# Patient Record
Sex: FEMALE | Race: ASIAN | NOT HISPANIC OR LATINO
[De-identification: names, ages, dates, MRNs, and addresses within clinical notes are randomized per-mention and may not be internally consistent; named-entity substitution may affect disease eponyms.]

---

## 2019-12-03 PROBLEM — Z00.00 ENCOUNTER FOR PREVENTIVE HEALTH EXAMINATION: Status: ACTIVE | Noted: 2019-12-03

## 2019-12-04 ENCOUNTER — APPOINTMENT (OUTPATIENT)
Dept: CARDIOLOGY | Facility: CLINIC | Age: 79
End: 2019-12-04
Payer: MEDICARE

## 2019-12-04 VITALS
OXYGEN SATURATION: 97 % | SYSTOLIC BLOOD PRESSURE: 120 MMHG | RESPIRATION RATE: 17 BRPM | WEIGHT: 120 LBS | HEART RATE: 91 BPM | HEIGHT: 60.5 IN | TEMPERATURE: 98 F | BODY MASS INDEX: 22.95 KG/M2 | DIASTOLIC BLOOD PRESSURE: 73 MMHG

## 2019-12-04 DIAGNOSIS — E78.5 HYPERLIPIDEMIA, UNSPECIFIED: ICD-10-CM

## 2019-12-04 DIAGNOSIS — M79.622 PAIN IN LEFT UPPER ARM: ICD-10-CM

## 2019-12-04 DIAGNOSIS — H54.8 LEGAL BLINDNESS, AS DEFINED IN USA: ICD-10-CM

## 2019-12-04 DIAGNOSIS — M54.9 DORSALGIA, UNSPECIFIED: ICD-10-CM

## 2019-12-04 DIAGNOSIS — I20.8 OTHER FORMS OF ANGINA PECTORIS: ICD-10-CM

## 2019-12-04 DIAGNOSIS — R00.2 PALPITATIONS: ICD-10-CM

## 2019-12-04 DIAGNOSIS — M81.0 AGE-RELATED OSTEOPOROSIS W/OUT CURRENT PATHOLOGICAL FRACTURE: ICD-10-CM

## 2019-12-04 PROCEDURE — 93015 CV STRESS TEST SUPVJ I&R: CPT

## 2019-12-04 PROCEDURE — 93306 TTE W/DOPPLER COMPLETE: CPT

## 2019-12-04 PROCEDURE — 99204 OFFICE O/P NEW MOD 45 MIN: CPT | Mod: 25

## 2019-12-04 PROCEDURE — ZZZZZ: CPT

## 2019-12-04 RX ORDER — RALOXIFENE HYDROCHLORIDE 60 MG/1
60 TABLET, FILM COATED ORAL
Refills: 0 | Status: ACTIVE | COMMUNITY

## 2019-12-04 RX ORDER — ASPIRIN ENTERIC COATED TABLETS 81 MG 81 MG/1
81 TABLET, DELAYED RELEASE ORAL
Refills: 0 | Status: ACTIVE | COMMUNITY

## 2019-12-04 RX ORDER — SIMVASTATIN 5 MG/1
5 TABLET, FILM COATED ORAL
Refills: 0 | Status: ACTIVE | COMMUNITY

## 2019-12-04 NOTE — HISTORY OF PRESENT ILLNESS
[FreeTextEntry1] : 79 year-old female with HLD presents for evaluation of L arm/back pain for the last 3 weeks. Patient reports that her arm and back feels sore, not related to exertion. Patient reports that she also gets SOB and diaphoretic lasting 2 hours. Patient's L shoulder is tender to touch. Patient reports SOB with exertion. I advised patient to undergo an echocardiogram and a treadmill stress test. I advised patient to wear a Holter monitor.

## 2019-12-10 ENCOUNTER — RESULT REVIEW (OUTPATIENT)
Age: 79
End: 2019-12-10

## 2019-12-13 ENCOUNTER — NON-APPOINTMENT (OUTPATIENT)
Age: 79
End: 2019-12-13

## 2019-12-31 ENCOUNTER — RESULT REVIEW (OUTPATIENT)
Age: 79
End: 2019-12-31

## 2023-10-20 ENCOUNTER — INPATIENT (INPATIENT)
Facility: HOSPITAL | Age: 83
LOS: 2 days | Discharge: HOME HEALTH | DRG: 179 | End: 2023-10-23
Attending: HOSPITALIST | Admitting: HOSPITALIST
Payer: MEDICARE

## 2023-10-20 VITALS
RESPIRATION RATE: 18 BRPM | OXYGEN SATURATION: 99 % | TEMPERATURE: 99 F | DIASTOLIC BLOOD PRESSURE: 79 MMHG | SYSTOLIC BLOOD PRESSURE: 127 MMHG | HEART RATE: 73 BPM

## 2023-10-20 DIAGNOSIS — Z90.49 ACQUIRED ABSENCE OF OTHER SPECIFIED PARTS OF DIGESTIVE TRACT: Chronic | ICD-10-CM

## 2023-10-20 DIAGNOSIS — R42 DIZZINESS AND GIDDINESS: ICD-10-CM

## 2023-10-20 LAB
ALBUMIN SERPL ELPH-MCNC: 4.4 G/DL — SIGNIFICANT CHANGE UP (ref 3.3–5)
ALBUMIN SERPL ELPH-MCNC: 4.4 G/DL — SIGNIFICANT CHANGE UP (ref 3.3–5)
ALP SERPL-CCNC: 80 U/L — SIGNIFICANT CHANGE UP (ref 40–120)
ALP SERPL-CCNC: 80 U/L — SIGNIFICANT CHANGE UP (ref 40–120)
ALT FLD-CCNC: 15 U/L — SIGNIFICANT CHANGE UP (ref 10–45)
ALT FLD-CCNC: 15 U/L — SIGNIFICANT CHANGE UP (ref 10–45)
ANION GAP SERPL CALC-SCNC: 11 MMOL/L — SIGNIFICANT CHANGE UP (ref 5–17)
ANION GAP SERPL CALC-SCNC: 11 MMOL/L — SIGNIFICANT CHANGE UP (ref 5–17)
APPEARANCE UR: CLEAR — SIGNIFICANT CHANGE UP
APPEARANCE UR: CLEAR — SIGNIFICANT CHANGE UP
APTT BLD: 28.4 SEC — SIGNIFICANT CHANGE UP (ref 24.5–35.6)
APTT BLD: 28.4 SEC — SIGNIFICANT CHANGE UP (ref 24.5–35.6)
AST SERPL-CCNC: 20 U/L — SIGNIFICANT CHANGE UP (ref 10–40)
AST SERPL-CCNC: 20 U/L — SIGNIFICANT CHANGE UP (ref 10–40)
BACTERIA # UR AUTO: NEGATIVE — SIGNIFICANT CHANGE UP
BACTERIA # UR AUTO: NEGATIVE — SIGNIFICANT CHANGE UP
BASOPHILS # BLD AUTO: 0.01 K/UL — SIGNIFICANT CHANGE UP (ref 0–0.2)
BASOPHILS # BLD AUTO: 0.01 K/UL — SIGNIFICANT CHANGE UP (ref 0–0.2)
BASOPHILS NFR BLD AUTO: 0.2 % — SIGNIFICANT CHANGE UP (ref 0–2)
BASOPHILS NFR BLD AUTO: 0.2 % — SIGNIFICANT CHANGE UP (ref 0–2)
BILIRUB SERPL-MCNC: 0.5 MG/DL — SIGNIFICANT CHANGE UP (ref 0.2–1.2)
BILIRUB SERPL-MCNC: 0.5 MG/DL — SIGNIFICANT CHANGE UP (ref 0.2–1.2)
BILIRUB UR-MCNC: NEGATIVE — SIGNIFICANT CHANGE UP
BILIRUB UR-MCNC: NEGATIVE — SIGNIFICANT CHANGE UP
BUN SERPL-MCNC: 13 MG/DL — SIGNIFICANT CHANGE UP (ref 7–23)
BUN SERPL-MCNC: 13 MG/DL — SIGNIFICANT CHANGE UP (ref 7–23)
CALCIUM SERPL-MCNC: 9.6 MG/DL — SIGNIFICANT CHANGE UP (ref 8.4–10.5)
CALCIUM SERPL-MCNC: 9.6 MG/DL — SIGNIFICANT CHANGE UP (ref 8.4–10.5)
CHLORIDE SERPL-SCNC: 101 MMOL/L — SIGNIFICANT CHANGE UP (ref 96–108)
CHLORIDE SERPL-SCNC: 101 MMOL/L — SIGNIFICANT CHANGE UP (ref 96–108)
CO2 SERPL-SCNC: 29 MMOL/L — SIGNIFICANT CHANGE UP (ref 22–31)
CO2 SERPL-SCNC: 29 MMOL/L — SIGNIFICANT CHANGE UP (ref 22–31)
COLOR SPEC: COLORLESS — SIGNIFICANT CHANGE UP
COLOR SPEC: COLORLESS — SIGNIFICANT CHANGE UP
CREAT SERPL-MCNC: 0.55 MG/DL — SIGNIFICANT CHANGE UP (ref 0.5–1.3)
CREAT SERPL-MCNC: 0.55 MG/DL — SIGNIFICANT CHANGE UP (ref 0.5–1.3)
DIFF PNL FLD: NEGATIVE — SIGNIFICANT CHANGE UP
DIFF PNL FLD: NEGATIVE — SIGNIFICANT CHANGE UP
EGFR: 91 ML/MIN/1.73M2 — SIGNIFICANT CHANGE UP
EGFR: 91 ML/MIN/1.73M2 — SIGNIFICANT CHANGE UP
EOSINOPHIL # BLD AUTO: 0.06 K/UL — SIGNIFICANT CHANGE UP (ref 0–0.5)
EOSINOPHIL # BLD AUTO: 0.06 K/UL — SIGNIFICANT CHANGE UP (ref 0–0.5)
EOSINOPHIL NFR BLD AUTO: 1 % — SIGNIFICANT CHANGE UP (ref 0–6)
EOSINOPHIL NFR BLD AUTO: 1 % — SIGNIFICANT CHANGE UP (ref 0–6)
EPI CELLS # UR: 0 /HPF — SIGNIFICANT CHANGE UP
EPI CELLS # UR: 0 /HPF — SIGNIFICANT CHANGE UP
GLUCOSE SERPL-MCNC: 135 MG/DL — HIGH (ref 70–99)
GLUCOSE SERPL-MCNC: 135 MG/DL — HIGH (ref 70–99)
GLUCOSE UR QL: NEGATIVE — SIGNIFICANT CHANGE UP
GLUCOSE UR QL: NEGATIVE — SIGNIFICANT CHANGE UP
HCT VFR BLD CALC: 37.5 % — SIGNIFICANT CHANGE UP (ref 34.5–45)
HCT VFR BLD CALC: 37.5 % — SIGNIFICANT CHANGE UP (ref 34.5–45)
HGB BLD-MCNC: 12.6 G/DL — SIGNIFICANT CHANGE UP (ref 11.5–15.5)
HGB BLD-MCNC: 12.6 G/DL — SIGNIFICANT CHANGE UP (ref 11.5–15.5)
HYALINE CASTS # UR AUTO: 0 /LPF — SIGNIFICANT CHANGE UP (ref 0–2)
HYALINE CASTS # UR AUTO: 0 /LPF — SIGNIFICANT CHANGE UP (ref 0–2)
IMM GRANULOCYTES NFR BLD AUTO: 0.3 % — SIGNIFICANT CHANGE UP (ref 0–0.9)
IMM GRANULOCYTES NFR BLD AUTO: 0.3 % — SIGNIFICANT CHANGE UP (ref 0–0.9)
INR BLD: 1.05 RATIO — SIGNIFICANT CHANGE UP (ref 0.85–1.18)
INR BLD: 1.05 RATIO — SIGNIFICANT CHANGE UP (ref 0.85–1.18)
KETONES UR-MCNC: NEGATIVE — SIGNIFICANT CHANGE UP
KETONES UR-MCNC: NEGATIVE — SIGNIFICANT CHANGE UP
LEUKOCYTE ESTERASE UR-ACNC: NEGATIVE — SIGNIFICANT CHANGE UP
LEUKOCYTE ESTERASE UR-ACNC: NEGATIVE — SIGNIFICANT CHANGE UP
LYMPHOCYTES # BLD AUTO: 2.05 K/UL — SIGNIFICANT CHANGE UP (ref 1–3.3)
LYMPHOCYTES # BLD AUTO: 2.05 K/UL — SIGNIFICANT CHANGE UP (ref 1–3.3)
LYMPHOCYTES # BLD AUTO: 34.7 % — SIGNIFICANT CHANGE UP (ref 13–44)
LYMPHOCYTES # BLD AUTO: 34.7 % — SIGNIFICANT CHANGE UP (ref 13–44)
MCHC RBC-ENTMCNC: 30.1 PG — SIGNIFICANT CHANGE UP (ref 27–34)
MCHC RBC-ENTMCNC: 30.1 PG — SIGNIFICANT CHANGE UP (ref 27–34)
MCHC RBC-ENTMCNC: 33.6 GM/DL — SIGNIFICANT CHANGE UP (ref 32–36)
MCHC RBC-ENTMCNC: 33.6 GM/DL — SIGNIFICANT CHANGE UP (ref 32–36)
MCV RBC AUTO: 89.7 FL — SIGNIFICANT CHANGE UP (ref 80–100)
MCV RBC AUTO: 89.7 FL — SIGNIFICANT CHANGE UP (ref 80–100)
MONOCYTES # BLD AUTO: 0.63 K/UL — SIGNIFICANT CHANGE UP (ref 0–0.9)
MONOCYTES # BLD AUTO: 0.63 K/UL — SIGNIFICANT CHANGE UP (ref 0–0.9)
MONOCYTES NFR BLD AUTO: 10.7 % — SIGNIFICANT CHANGE UP (ref 2–14)
MONOCYTES NFR BLD AUTO: 10.7 % — SIGNIFICANT CHANGE UP (ref 2–14)
NEUTROPHILS # BLD AUTO: 3.14 K/UL — SIGNIFICANT CHANGE UP (ref 1.8–7.4)
NEUTROPHILS # BLD AUTO: 3.14 K/UL — SIGNIFICANT CHANGE UP (ref 1.8–7.4)
NEUTROPHILS NFR BLD AUTO: 53.1 % — SIGNIFICANT CHANGE UP (ref 43–77)
NEUTROPHILS NFR BLD AUTO: 53.1 % — SIGNIFICANT CHANGE UP (ref 43–77)
NITRITE UR-MCNC: NEGATIVE — SIGNIFICANT CHANGE UP
NITRITE UR-MCNC: NEGATIVE — SIGNIFICANT CHANGE UP
NRBC # BLD: 0 /100 WBCS — SIGNIFICANT CHANGE UP (ref 0–0)
NRBC # BLD: 0 /100 WBCS — SIGNIFICANT CHANGE UP (ref 0–0)
PH UR: 6.5 — SIGNIFICANT CHANGE UP (ref 5–8)
PH UR: 6.5 — SIGNIFICANT CHANGE UP (ref 5–8)
PLATELET # BLD AUTO: 225 K/UL — SIGNIFICANT CHANGE UP (ref 150–400)
PLATELET # BLD AUTO: 225 K/UL — SIGNIFICANT CHANGE UP (ref 150–400)
POTASSIUM SERPL-MCNC: 4.2 MMOL/L — SIGNIFICANT CHANGE UP (ref 3.5–5.3)
POTASSIUM SERPL-MCNC: 4.2 MMOL/L — SIGNIFICANT CHANGE UP (ref 3.5–5.3)
POTASSIUM SERPL-SCNC: 4.2 MMOL/L — SIGNIFICANT CHANGE UP (ref 3.5–5.3)
POTASSIUM SERPL-SCNC: 4.2 MMOL/L — SIGNIFICANT CHANGE UP (ref 3.5–5.3)
PROT SERPL-MCNC: 7.3 G/DL — SIGNIFICANT CHANGE UP (ref 6–8.3)
PROT SERPL-MCNC: 7.3 G/DL — SIGNIFICANT CHANGE UP (ref 6–8.3)
PROT UR-MCNC: NEGATIVE — SIGNIFICANT CHANGE UP
PROT UR-MCNC: NEGATIVE — SIGNIFICANT CHANGE UP
PROTHROM AB SERPL-ACNC: 11 SEC — SIGNIFICANT CHANGE UP (ref 9.5–13)
PROTHROM AB SERPL-ACNC: 11 SEC — SIGNIFICANT CHANGE UP (ref 9.5–13)
RAPID RVP RESULT: DETECTED
RAPID RVP RESULT: DETECTED
RBC # BLD: 4.18 M/UL — SIGNIFICANT CHANGE UP (ref 3.8–5.2)
RBC # BLD: 4.18 M/UL — SIGNIFICANT CHANGE UP (ref 3.8–5.2)
RBC # FLD: 13.2 % — SIGNIFICANT CHANGE UP (ref 10.3–14.5)
RBC # FLD: 13.2 % — SIGNIFICANT CHANGE UP (ref 10.3–14.5)
RBC CASTS # UR COMP ASSIST: 0 /HPF — SIGNIFICANT CHANGE UP (ref 0–4)
RBC CASTS # UR COMP ASSIST: 0 /HPF — SIGNIFICANT CHANGE UP (ref 0–4)
SARS-COV-2 RNA SPEC QL NAA+PROBE: DETECTED
SARS-COV-2 RNA SPEC QL NAA+PROBE: DETECTED
SODIUM SERPL-SCNC: 141 MMOL/L — SIGNIFICANT CHANGE UP (ref 135–145)
SODIUM SERPL-SCNC: 141 MMOL/L — SIGNIFICANT CHANGE UP (ref 135–145)
SP GR SPEC: 1.05 — HIGH (ref 1.01–1.02)
SP GR SPEC: 1.05 — HIGH (ref 1.01–1.02)
TROPONIN T, HIGH SENSITIVITY RESULT: 16 NG/L — SIGNIFICANT CHANGE UP (ref 0–51)
TROPONIN T, HIGH SENSITIVITY RESULT: 16 NG/L — SIGNIFICANT CHANGE UP (ref 0–51)
UROBILINOGEN FLD QL: NEGATIVE — SIGNIFICANT CHANGE UP
UROBILINOGEN FLD QL: NEGATIVE — SIGNIFICANT CHANGE UP
WBC # BLD: 5.91 K/UL — SIGNIFICANT CHANGE UP (ref 3.8–10.5)
WBC # BLD: 5.91 K/UL — SIGNIFICANT CHANGE UP (ref 3.8–10.5)
WBC # FLD AUTO: 5.91 K/UL — SIGNIFICANT CHANGE UP (ref 3.8–10.5)
WBC # FLD AUTO: 5.91 K/UL — SIGNIFICANT CHANGE UP (ref 3.8–10.5)
WBC UR QL: 2 /HPF — SIGNIFICANT CHANGE UP (ref 0–5)
WBC UR QL: 2 /HPF — SIGNIFICANT CHANGE UP (ref 0–5)

## 2023-10-20 PROCEDURE — 71045 X-RAY EXAM CHEST 1 VIEW: CPT | Mod: 26

## 2023-10-20 PROCEDURE — 99285 EMERGENCY DEPT VISIT HI MDM: CPT

## 2023-10-20 RX ORDER — MECLIZINE HCL 12.5 MG
25 TABLET ORAL ONCE
Refills: 0 | Status: COMPLETED | OUTPATIENT
Start: 2023-10-20 | End: 2023-10-20

## 2023-10-20 RX ORDER — ONDANSETRON 8 MG/1
4 TABLET, FILM COATED ORAL ONCE
Refills: 0 | Status: COMPLETED | OUTPATIENT
Start: 2023-10-20 | End: 2023-10-20

## 2023-10-20 RX ADMIN — ONDANSETRON 4 MILLIGRAM(S): 8 TABLET, FILM COATED ORAL at 19:05

## 2023-10-20 RX ADMIN — Medication 25 MILLIGRAM(S): at 19:06

## 2023-10-20 NOTE — CONSULT NOTE ADULT - NSCONSULTADDITIONALINFOA_GEN_ALL_CORE
Neurovascular Fellow Attestation    83F with positional dizziness now resolved without other focal neurological symptoms. PMH of HLD and legally blind was also found to have COVID+ on admission. CTH/CTA is unremarkable for acute changes or hemodynamically significant steno-occlusive disease. Given the patient’s clinical history, nonfocal neurological examination, and imaging findings, patient’s symptoms could be secondary to COVID vs. Peripheral vertigo. As she is back to baseline currently, would not recommend further inpatient neurovascular workup. If her symptoms recur, can consider general neurology investigation for peripheral vestibular dysfunction.    Kulwant Espinal  Neurovascular Fellow

## 2023-10-20 NOTE — ED PROVIDER NOTE - ATTENDING CONTRIBUTION TO CARE
Attending Yfn: I performed a history and physical exam of the patient and discussed their management with the resident/fellow/student. I have reviewed the resident/fellow/student note and agree with the documented findings and plan of care, except as noted. I have personally performed a substantive portion of the visit including all aspects of the medical decision making. My medical decision making and observations are found above. Please refer to any progress notes for updates on clinical course. My notes supersedes the above resident/fellow/student note in case of discrepancy

## 2023-10-20 NOTE — CONSULT NOTE ADULT - ATTENDING COMMENTS
I reviewed available diagnostic studies, and reviewed images personally. I agree with resident & fellow 's history, exam, orders placed, and plan of care. dizziness most likely peripheral vs 2/2 COVID+. Back to baseline. No further neuro w up necessary. CT/CTA/CTP wo significant occlusion or stenosis to explain her symptoms. asa 81mg to continue. Service provided on 10/21/2023.

## 2023-10-20 NOTE — ED ADULT NURSE NOTE - NSFALLRISKINTERV_ED_ALL_ED

## 2023-10-20 NOTE — CONSULT NOTE ADULT - SUBJECTIVE AND OBJECTIVE BOX
Neurology - Consult Note    -  Spectra: [phone] ([facility]), [phone] ([facility])  -    HPI: Patient ALEKSANDR RAMIREZ is a 83y (1940) woman with a PMHx significant for HLD, legally blind b/l (unclear etiology) who presented to the ED for dizziness. Patient suddenly felt room spinning dizziness worse with movement; vomited x2. Still symptomatic. Denied HA, weakness, numbness, vision changes. She had difficulty ambulating.       Review of Systems:     All other review of systems is negative unless indicated above.    Allergies:  No Known Allergies      PMHx/PSHx/Family Hx: As above, otherwise see below       Social Hx:  No reported use of tobacco, alcohol, or illicit drugs    Medications:  MEDICATIONS  (STANDING):  meclizine 25 milliGRAM(s) Oral Once  ondansetron Injectable 4 milliGRAM(s) IV Push once    MEDICATIONS  (PRN):        Home Medications:      Vitals:  T(C): 37.1 (10-20-23 @ 18:25), Max: 37.1 (10-20-23 @ 18:25)  HR: 73 (10-20-23 @ 18:25) (73 - 73)  BP: 127/79 (10-20-23 @ 18:25) (127/79 - 127/79)  RR: 18 (10-20-23 @ 18:25) (18 - 18)  SpO2: 99% (10-20-23 @ 18:25) (99% - 99%)    Physical Examination: INCOMPLETE  General - NAD  Cardiovascular - Peripheral pulses palpable, no edema    Neurologic Exam:  Mental status - Awake, Alert, Oriented to person, place, and time. Speech fluent, repetition and naming intact. Follows simple and complex commands. attention, concentration and fund of knowledge intact.     Cranial nerves:  CN II: Visual fields are full to confrontation. Pupils are 3 mm and briskly reactive to light.   CN III, IV, VI: EOMI, no nystagmus, no ptosis  CN V: Facial sensation is intact to pinprick in all 3 divisions bilaterally.  CN VII: Face with mild left NLFF with normal eye closure and smile.  CN VII: Hearing is normal to rubbing fingers  CN IX, X: Palate elevates symmetrically. Phonation is normal.  CN XI: Head turning and shoulder shrug are intact  CN XII: Tongue is midline with normal movements and no atrophy.    Motor - Normal bulk and tone throughout. No pronator drift.  Strength testing            Deltoid      Biceps      Triceps     Wrist Extension    Wrist Flexion       R            5                 5               5                     5                              5                        5  L             5                 5               5                     5                              5                       5              Hip Flexion    Hip Extension    Knee Flexion    Knee Extension    Dorsiflexion    Plantar Flexion  R              5                           5                       5                           5                            5                          5  L              5                           5                        5                           5                            5                          5    neg hoffmans b/l    Sensation - Light touch/temperature intact throughout    DTR's -             Biceps      Triceps     Brachioradialis      Patellar    Ankle    Toes/plantar response  R             2+             2+                  2+                       2+            2+                 Down  L              2+             2+                 2+                        2+           2+                 Down    Coordination - Finger to Nose and heal to shin intact b/l. No tremors appreciated    Gait and station - Normal casual gait. Romberg (-)    Labs:          CAPILLARY BLOOD GLUCOSE      POCT Blood Glucose.: 124 mg/dL (20 Oct 2023 18:30)          CSF:                  Radiology:     Neurology - Consult Note    -  Spectra: [phone] ([facility]), [phone] ([facility])  -    HPI: Patient ALEKSANDR RAMIREZ is a 83y (1940) woman mandarin speaking with a PMHx significant for HLD, legally blind b/l (unclear etiology) who presented to the ED for dizziness. LKW 9am; Patient suddenly felt room spinning dizziness worse with movement; vomited x2. Denied HA, weakness, numbness, vision changes. No prior similar episodes. Pt was on asa however taken off 3-4 months ago by family doctor.  PT c/o around one week of wet cough, denied fevers/chills.    NIHSS: 0  Baseline mRS: 0  Not a tenecteplase candidate due to presentation outside the window.   Not a candidate for thrombectomy due to no LVO on imaging.       Review of Systems:     All other review of systems is negative unless indicated above.    Allergies:  No Known Allergies      PMHx/PSHx/Family Hx: As above, otherwise see below       Social Hx:  No reported use of tobacco, alcohol, or illicit drugs    Medications:  MEDICATIONS  (STANDING):  meclizine 25 milliGRAM(s) Oral Once  ondansetron Injectable 4 milliGRAM(s) IV Push once    MEDICATIONS  (PRN):        Home Medications:      Vitals:  T(C): 37.1 (10-20-23 @ 18:25), Max: 37.1 (10-20-23 @ 18:25)  HR: 73 (10-20-23 @ 18:25) (73 - 73)  BP: 127/79 (10-20-23 @ 18:25) (127/79 - 127/79)  RR: 18 (10-20-23 @ 18:25) (18 - 18)  SpO2: 99% (10-20-23 @ 18:25) (99% - 99%)    Physical Examination:    General - NAD  Cardiovascular - Peripheral pulses palpable, no edema    Neurologic Exam:  Mental status - Awake, Alert, Oriented to person, place, and time. Speech fluent, repetition and naming intact. Follows simple and complex commands. attention, concentration and fund of knowledge intact.       CNs: PERRL (R 2mm, L 2mm). blink to threat b/l. cannot count fingers b/l (chronic) EOMI. V1-3 intact LT, No facial asymmetry b/l. Hearing grossly normal b/l. Tongue midline.     Motor - Normal bulk and tone throughout. No pronator drift.  Strength testing            Deltoid      Biceps      Triceps     Wrist Extension    Wrist Flexion       R            5                 5               5                     5                              5                        5  L             5                 5               5                     5                              5                       5              Hip Flexion    Hip Extension    Knee Flexion    Knee Extension    Dorsiflexion    Plantar Flexion  R              5                           5                       5                           5                            5                          5  L              5                           5                        5                           5                            5                          5    neg hoffmans b/l    Sensation - Light touch/temperature intact throughout    DTR's -             Biceps      Triceps     Brachioradialis      Patellar    Ankle    Toes/plantar response  R             2+             2+                  2+                       2+            2+                 Down  L              2+             2+                 2+                        2+           2+                 Down    Coordination - Finger to Nose and heal to shin intact b/l. No tremors appreciated    Gait and station - unable to walk d/t dizziness     Labs:          CAPILLARY BLOOD GLUCOSE      POCT Blood Glucose.: 124 mg/dL (20 Oct 2023 18:30)           Radiology:

## 2023-10-20 NOTE — ED ADULT NURSE NOTE - OBJECTIVE STATEMENT
pt family prefers to translate for patient. pt primary language is mandarin. as per pt family pt "started to feel dizzy and vomited twice at 1400. the dizziness hasn't gone away and is still very dizzy." pt follows commands, denies any lightheadedness, chest pain, SOB, abd. pain, diarrhea, numbness/tingling at present. left sided flattened nasolabial fold noted at present. pt speaking in clear sentences. pt moves all extremities and has full ROM, pt ambulatory with unsteady gait at present.

## 2023-10-20 NOTE — ED PROVIDER NOTE - PROGRESS NOTE DETAILS
Attending Yfn: signed out to evening attending pending final neuro recs. likely admission. Pt reassessed. States she is feeling less dizzy. Denies nausea. Assessed gait. Was able to walk however with unsteady gait and small steps. Informed of plan for admission. Agreeable to plan. SL Pt reassessed. States she is feeling less dizzy. Denies nausea. Assessed gait. Was able to walk w/ assistance, unsteady gait and small steps. Informed pt and daughter at bedside of plan for admission. Agreeable to plan. SL Pt reassessed. States she is feeling less dizzy after meclizine. Denies nausea. Assessed gait. Was able to walk w/ assistance, unsteady gait and small steps. Informed pt and daughter at bedside of plan for admission for further workup. Agreeable to plan. SL

## 2023-10-20 NOTE — CONSULT NOTE ADULT - ASSESSMENT
83y (1940) woman mandarin speaking with a PMHx significant for HLD, legally blind b/l (unclear etiology) who presented to the ED for dizziness. LKW 9am; Patient suddenly felt room spinning dizziness worse with movement; vomited x2. Denied HA, weakness, numbness, vision changes. No prior similar episodes. Pt was on asa however taken off 3-4 months ago by family doctor.  Pt c/o around one week of wet cough, denied fevers/chills.    Impression: Acute vestibular syndrome of unclear etiology likely peripheral etiology; intact vertebrobasilar system on cta to my eye. 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    Recommendations:    Imaging/Labs     [] F/u CT/CTA  [] HbA1C and Lipid Panel.  [] Orthostatics  [] MRI brain w/o contrast if admitted    Meds  []  IVFs  [] Meclizine 25mg BID PRN (can increase to 50mg Q8)  [] Ondansetron 8mg up to q8hr PRN OR Reglan 4mg PRN q8h PRN for N/V  [] Can try Clonzepam 0.5mg now then Q8H PRN (for 2 to 3 days)  [] Restart Aspirin 81MG PO daily (ASA 300MG MD daily if patient fails dysphagia screen)    Other  [] Telemonitoring; Neurochecks and vital signs per unit protocol, Q4H  [] Permissive HTN up to 220/120 mmHg for first 24 hours after symptom onset followed by gradual normotension.   [] BG goal <180, avoid hypoglycemia  [] Fall, aspiration precautions      continue to work up toxic/metabolic/ infection as you are doing     Patient can follow up with general neurology at [address] [place] [address] 1-2 weeks after discharge. Please instruct the patient to call [phone] to schedule this appointment.     Above mentioned plan was discussed with patient and available family member in detail. All the questions were answered and concerns were addressed.    Case discussed with stroke fellow under the supervision of stroke attending 83y (1940) woman mandarin speaking with a PMHx significant for HLD, legally blind b/l (unclear etiology) who presented to the ED for dizziness. LKW 9am; Patient suddenly felt room spinning dizziness worse with movement; vomited x2. Denied HA, weakness, numbness, vision changes. No prior similar episodes. Pt was on asa however taken off 3-4 months ago by family doctor.  Pt c/o around one week of wet cough, denied fevers/chills.    Impression: Acute vestibular syndrome of unclear etiology likely peripheral etiology; intact vertebrobasilar system on cta to my eye. CTP negative. 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    Recommendations:    Imaging/Labs     [] F/u CT/CTA  [] HbA1C and Lipid Panel.  [] Orthostatics  [] MRI brain w/o contrast if admitted    Meds  [] IVFs  [] Meclizine 25mg BID PRN (can increase to 50mg Q8)  [] Ondansetron 8mg up to q8hr PRN OR Reglan 4mg PRN q8h PRN for N/V  [] Can try Clonzepam 0.5mg now then Q8H PRN (for 2 to 3 days)  [] Restart Aspirin 81MG PO daily (ASA 300MG NM daily if patient fails dysphagia screen)    Other  [] Telemonitoring; Neurochecks and vital signs per unit protocol, Q4H  [] Permissive HTN up to 220/120 mmHg for first 24 hours after symptom onset followed by gradual normotension.   [] BG goal <180, avoid hypoglycemia  [] Fall, aspiration precautions      Continue to work up toxic/metabolic/ infection as you are doing     Patient can follow up with general neurology at [address] [place] [address] 1-2 weeks after discharge. Please instruct the patient to call [phone] to schedule this appointment.     Above mentioned plan was discussed with patient and available family member in detail. All the questions were answered and concerns were addressed.    Case discussed with stroke fellow under the supervision of stroke attending

## 2023-10-20 NOTE — ED PROVIDER NOTE - PHYSICAL EXAMINATION
Gen: NAD, AOx3, able to make needs known, non-toxic  Head: NCAT  HEENT: EOMI, oral mucosa moist, normal conjunctiva  Lung: no respiratory distress, CTAB, no wheezes/rhonchi/rales B/L, speaking in full sentences  CV: RRR, no murmurs  Abd: non distended, soft, nontender, no guarding, no CVA tenderness  MSK: no visible deformities  Neuro: Appears non focal. see NIHSS below  Skin: Warm, well perfused  Psych: normal affect

## 2023-10-20 NOTE — ED PROVIDER NOTE - NS ED MD DISPO ISOLATION TYPES
Contacted RN on floor she is unavailable at this time. She will call writer back when she becomes available to bring patient to the floor   None

## 2023-10-20 NOTE — ED PROVIDER NOTE - OBJECTIVE STATEMENT
83yof mandarin speaking w/ hx of HLD p/w dizziness since around 9am, acutely worsened around 2pm, a/w NBNB vomiting x2. States feeling things spinning and unable to stand still. Not improved by lying down. Brought in by daughter. Stroke code activated. Daughter reports pt has been having cough for the last week w/ productive cough for the last few days. Denies fever, SOB, CP, diarrhea, headache.

## 2023-10-20 NOTE — ED PROVIDER NOTE - CLINICAL SUMMARY MEDICAL DECISION MAKING FREE TEXT BOX
Attending Yfn: 82 y/o F w/ PMH of HLD presenting w/ dizziness. Seen w/ daughter. Code stroke at triage. Reports last felt well around 9am. At that time had developed some dizziness and vomiting. Symptoms worsened at 2pm. Had additional NBNB vomiting. Describing dizziness as room spinning. Having difficulty walking due to the dizziness. Usually walks w/o assistance. No AC use. No falls or head trauma. No meds taken at home. Pt overall no acute distress. See physical exam above as authored by me and NIHSS below as authored by me. Code stroke imaging and labs obtained. NIHSS of 1, outside of tenectaplase window. Will eval for metabolic vs. infectious abnormalities. Possible peripheral vertigo, will treat symptomatically. Plan for labs, meds, imaging, EKG. Will reassess the need for additional interventions as clinically warranted. Refer to any progress notes for updates on clinical course and as a continuation of this MDM.

## 2023-10-21 DIAGNOSIS — Z29.9 ENCOUNTER FOR PROPHYLACTIC MEASURES, UNSPECIFIED: ICD-10-CM

## 2023-10-21 DIAGNOSIS — H54.7 UNSPECIFIED VISUAL LOSS: ICD-10-CM

## 2023-10-21 DIAGNOSIS — U07.1 COVID-19: ICD-10-CM

## 2023-10-21 DIAGNOSIS — R42 DIZZINESS AND GIDDINESS: ICD-10-CM

## 2023-10-21 DIAGNOSIS — E78.5 HYPERLIPIDEMIA, UNSPECIFIED: ICD-10-CM

## 2023-10-21 LAB
A1C WITH ESTIMATED AVERAGE GLUCOSE RESULT: 5.8 % — HIGH (ref 4–5.6)
A1C WITH ESTIMATED AVERAGE GLUCOSE RESULT: 5.8 % — HIGH (ref 4–5.6)
ALBUMIN SERPL ELPH-MCNC: 3.7 G/DL — SIGNIFICANT CHANGE UP (ref 3.3–5)
ALBUMIN SERPL ELPH-MCNC: 3.7 G/DL — SIGNIFICANT CHANGE UP (ref 3.3–5)
ALP SERPL-CCNC: 66 U/L — SIGNIFICANT CHANGE UP (ref 40–120)
ALP SERPL-CCNC: 66 U/L — SIGNIFICANT CHANGE UP (ref 40–120)
ALT FLD-CCNC: 12 U/L — SIGNIFICANT CHANGE UP (ref 10–45)
ALT FLD-CCNC: 12 U/L — SIGNIFICANT CHANGE UP (ref 10–45)
ANION GAP SERPL CALC-SCNC: 12 MMOL/L — SIGNIFICANT CHANGE UP (ref 5–17)
ANION GAP SERPL CALC-SCNC: 12 MMOL/L — SIGNIFICANT CHANGE UP (ref 5–17)
AST SERPL-CCNC: 18 U/L — SIGNIFICANT CHANGE UP (ref 10–40)
AST SERPL-CCNC: 18 U/L — SIGNIFICANT CHANGE UP (ref 10–40)
BILIRUB SERPL-MCNC: 0.4 MG/DL — SIGNIFICANT CHANGE UP (ref 0.2–1.2)
BILIRUB SERPL-MCNC: 0.4 MG/DL — SIGNIFICANT CHANGE UP (ref 0.2–1.2)
BUN SERPL-MCNC: 9 MG/DL — SIGNIFICANT CHANGE UP (ref 7–23)
BUN SERPL-MCNC: 9 MG/DL — SIGNIFICANT CHANGE UP (ref 7–23)
CALCIUM SERPL-MCNC: 8.7 MG/DL — SIGNIFICANT CHANGE UP (ref 8.4–10.5)
CALCIUM SERPL-MCNC: 8.7 MG/DL — SIGNIFICANT CHANGE UP (ref 8.4–10.5)
CHLORIDE SERPL-SCNC: 105 MMOL/L — SIGNIFICANT CHANGE UP (ref 96–108)
CHLORIDE SERPL-SCNC: 105 MMOL/L — SIGNIFICANT CHANGE UP (ref 96–108)
CHOLEST SERPL-MCNC: 168 MG/DL — SIGNIFICANT CHANGE UP
CHOLEST SERPL-MCNC: 168 MG/DL — SIGNIFICANT CHANGE UP
CO2 SERPL-SCNC: 24 MMOL/L — SIGNIFICANT CHANGE UP (ref 22–31)
CO2 SERPL-SCNC: 24 MMOL/L — SIGNIFICANT CHANGE UP (ref 22–31)
CREAT SERPL-MCNC: 0.55 MG/DL — SIGNIFICANT CHANGE UP (ref 0.5–1.3)
CREAT SERPL-MCNC: 0.55 MG/DL — SIGNIFICANT CHANGE UP (ref 0.5–1.3)
EGFR: 91 ML/MIN/1.73M2 — SIGNIFICANT CHANGE UP
EGFR: 91 ML/MIN/1.73M2 — SIGNIFICANT CHANGE UP
ESTIMATED AVERAGE GLUCOSE: 120 MG/DL — HIGH (ref 68–114)
ESTIMATED AVERAGE GLUCOSE: 120 MG/DL — HIGH (ref 68–114)
GLUCOSE SERPL-MCNC: 93 MG/DL — SIGNIFICANT CHANGE UP (ref 70–99)
GLUCOSE SERPL-MCNC: 93 MG/DL — SIGNIFICANT CHANGE UP (ref 70–99)
HCT VFR BLD CALC: 36.2 % — SIGNIFICANT CHANGE UP (ref 34.5–45)
HCT VFR BLD CALC: 36.2 % — SIGNIFICANT CHANGE UP (ref 34.5–45)
HDLC SERPL-MCNC: 65 MG/DL — SIGNIFICANT CHANGE UP
HDLC SERPL-MCNC: 65 MG/DL — SIGNIFICANT CHANGE UP
HGB BLD-MCNC: 11.7 G/DL — SIGNIFICANT CHANGE UP (ref 11.5–15.5)
HGB BLD-MCNC: 11.7 G/DL — SIGNIFICANT CHANGE UP (ref 11.5–15.5)
LIPID PNL WITH DIRECT LDL SERPL: 89 MG/DL — SIGNIFICANT CHANGE UP
LIPID PNL WITH DIRECT LDL SERPL: 89 MG/DL — SIGNIFICANT CHANGE UP
MAGNESIUM SERPL-MCNC: 2.1 MG/DL — SIGNIFICANT CHANGE UP (ref 1.6–2.6)
MAGNESIUM SERPL-MCNC: 2.1 MG/DL — SIGNIFICANT CHANGE UP (ref 1.6–2.6)
MCHC RBC-ENTMCNC: 29.3 PG — SIGNIFICANT CHANGE UP (ref 27–34)
MCHC RBC-ENTMCNC: 29.3 PG — SIGNIFICANT CHANGE UP (ref 27–34)
MCHC RBC-ENTMCNC: 32.3 GM/DL — SIGNIFICANT CHANGE UP (ref 32–36)
MCHC RBC-ENTMCNC: 32.3 GM/DL — SIGNIFICANT CHANGE UP (ref 32–36)
MCV RBC AUTO: 90.7 FL — SIGNIFICANT CHANGE UP (ref 80–100)
MCV RBC AUTO: 90.7 FL — SIGNIFICANT CHANGE UP (ref 80–100)
NON HDL CHOLESTEROL: 103 MG/DL — SIGNIFICANT CHANGE UP
NON HDL CHOLESTEROL: 103 MG/DL — SIGNIFICANT CHANGE UP
NRBC # BLD: 0 /100 WBCS — SIGNIFICANT CHANGE UP (ref 0–0)
NRBC # BLD: 0 /100 WBCS — SIGNIFICANT CHANGE UP (ref 0–0)
PLATELET # BLD AUTO: 199 K/UL — SIGNIFICANT CHANGE UP (ref 150–400)
PLATELET # BLD AUTO: 199 K/UL — SIGNIFICANT CHANGE UP (ref 150–400)
POTASSIUM SERPL-MCNC: 3.6 MMOL/L — SIGNIFICANT CHANGE UP (ref 3.5–5.3)
POTASSIUM SERPL-MCNC: 3.6 MMOL/L — SIGNIFICANT CHANGE UP (ref 3.5–5.3)
POTASSIUM SERPL-SCNC: 3.6 MMOL/L — SIGNIFICANT CHANGE UP (ref 3.5–5.3)
POTASSIUM SERPL-SCNC: 3.6 MMOL/L — SIGNIFICANT CHANGE UP (ref 3.5–5.3)
PROCALCITONIN SERPL-MCNC: 0.04 NG/ML — SIGNIFICANT CHANGE UP (ref 0.02–0.1)
PROCALCITONIN SERPL-MCNC: 0.04 NG/ML — SIGNIFICANT CHANGE UP (ref 0.02–0.1)
PROT SERPL-MCNC: 6.2 G/DL — SIGNIFICANT CHANGE UP (ref 6–8.3)
PROT SERPL-MCNC: 6.2 G/DL — SIGNIFICANT CHANGE UP (ref 6–8.3)
RBC # BLD: 3.99 M/UL — SIGNIFICANT CHANGE UP (ref 3.8–5.2)
RBC # BLD: 3.99 M/UL — SIGNIFICANT CHANGE UP (ref 3.8–5.2)
RBC # FLD: 13.2 % — SIGNIFICANT CHANGE UP (ref 10.3–14.5)
RBC # FLD: 13.2 % — SIGNIFICANT CHANGE UP (ref 10.3–14.5)
SODIUM SERPL-SCNC: 141 MMOL/L — SIGNIFICANT CHANGE UP (ref 135–145)
SODIUM SERPL-SCNC: 141 MMOL/L — SIGNIFICANT CHANGE UP (ref 135–145)
TRIGL SERPL-MCNC: 80 MG/DL — SIGNIFICANT CHANGE UP
TRIGL SERPL-MCNC: 80 MG/DL — SIGNIFICANT CHANGE UP
WBC # BLD: 5.42 K/UL — SIGNIFICANT CHANGE UP (ref 3.8–10.5)
WBC # BLD: 5.42 K/UL — SIGNIFICANT CHANGE UP (ref 3.8–10.5)
WBC # FLD AUTO: 5.42 K/UL — SIGNIFICANT CHANGE UP (ref 3.8–10.5)
WBC # FLD AUTO: 5.42 K/UL — SIGNIFICANT CHANGE UP (ref 3.8–10.5)

## 2023-10-21 PROCEDURE — 70551 MRI BRAIN STEM W/O DYE: CPT | Mod: 26

## 2023-10-21 PROCEDURE — 99223 1ST HOSP IP/OBS HIGH 75: CPT

## 2023-10-21 PROCEDURE — 99222 1ST HOSP IP/OBS MODERATE 55: CPT

## 2023-10-21 RX ORDER — REMDESIVIR 5 MG/ML
200 INJECTION INTRAVENOUS EVERY 24 HOURS
Refills: 0 | Status: COMPLETED | OUTPATIENT
Start: 2023-10-21 | End: 2023-10-21

## 2023-10-21 RX ORDER — MECLIZINE HCL 12.5 MG
25 TABLET ORAL
Refills: 0 | Status: DISCONTINUED | OUTPATIENT
Start: 2023-10-21 | End: 2023-10-23

## 2023-10-21 RX ORDER — SODIUM CHLORIDE 9 MG/ML
1000 INJECTION INTRAMUSCULAR; INTRAVENOUS; SUBCUTANEOUS
Refills: 0 | Status: COMPLETED | OUTPATIENT
Start: 2023-10-21 | End: 2023-10-21

## 2023-10-21 RX ORDER — SIMVASTATIN 20 MG/1
5 TABLET, FILM COATED ORAL AT BEDTIME
Refills: 0 | Status: DISCONTINUED | OUTPATIENT
Start: 2023-10-21 | End: 2023-10-23

## 2023-10-21 RX ORDER — ONDANSETRON 8 MG/1
4 TABLET, FILM COATED ORAL EVERY 6 HOURS
Refills: 0 | Status: DISCONTINUED | OUTPATIENT
Start: 2023-10-21 | End: 2023-10-21

## 2023-10-21 RX ORDER — CHLORHEXIDINE GLUCONATE 213 G/1000ML
1 SOLUTION TOPICAL DAILY
Refills: 0 | Status: DISCONTINUED | OUTPATIENT
Start: 2023-10-21 | End: 2023-10-23

## 2023-10-21 RX ORDER — SODIUM CHLORIDE 9 MG/ML
100 INJECTION INTRAMUSCULAR; INTRAVENOUS; SUBCUTANEOUS
Refills: 0 | Status: DISCONTINUED | OUTPATIENT
Start: 2023-10-21 | End: 2023-10-21

## 2023-10-21 RX ORDER — ENOXAPARIN SODIUM 100 MG/ML
40 INJECTION SUBCUTANEOUS EVERY 24 HOURS
Refills: 0 | Status: DISCONTINUED | OUTPATIENT
Start: 2023-10-21 | End: 2023-10-23

## 2023-10-21 RX ORDER — REMDESIVIR 5 MG/ML
100 INJECTION INTRAVENOUS EVERY 24 HOURS
Refills: 0 | Status: COMPLETED | OUTPATIENT
Start: 2023-10-22 | End: 2023-10-23

## 2023-10-21 RX ORDER — ASPIRIN/CALCIUM CARB/MAGNESIUM 324 MG
81 TABLET ORAL DAILY
Refills: 0 | Status: DISCONTINUED | OUTPATIENT
Start: 2023-10-21 | End: 2023-10-23

## 2023-10-21 RX ORDER — REMDESIVIR 5 MG/ML
INJECTION INTRAVENOUS
Refills: 0 | Status: COMPLETED | OUTPATIENT
Start: 2023-10-21 | End: 2023-10-23

## 2023-10-21 RX ORDER — ACETAMINOPHEN 500 MG
650 TABLET ORAL EVERY 6 HOURS
Refills: 0 | Status: DISCONTINUED | OUTPATIENT
Start: 2023-10-21 | End: 2023-10-23

## 2023-10-21 RX ORDER — REMDESIVIR 5 MG/ML
200 INJECTION INTRAVENOUS ONCE
Refills: 0 | Status: DISCONTINUED | OUTPATIENT
Start: 2023-10-21 | End: 2023-10-21

## 2023-10-21 RX ADMIN — Medication 81 MILLIGRAM(S): at 11:10

## 2023-10-21 RX ADMIN — SODIUM CHLORIDE 100 MILLILITER(S): 9 INJECTION INTRAMUSCULAR; INTRAVENOUS; SUBCUTANEOUS at 15:56

## 2023-10-21 RX ADMIN — SIMVASTATIN 5 MILLIGRAM(S): 20 TABLET, FILM COATED ORAL at 21:55

## 2023-10-21 RX ADMIN — ENOXAPARIN SODIUM 40 MILLIGRAM(S): 100 INJECTION SUBCUTANEOUS at 06:29

## 2023-10-21 RX ADMIN — REMDESIVIR 200 MILLIGRAM(S): 5 INJECTION INTRAVENOUS at 11:07

## 2023-10-21 RX ADMIN — Medication 1 TABLET(S): at 11:10

## 2023-10-21 RX ADMIN — CHLORHEXIDINE GLUCONATE 1 APPLICATION(S): 213 SOLUTION TOPICAL at 11:19

## 2023-10-21 NOTE — H&P ADULT - ASSESSMENT
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 Found to be COVID-19 positive, r/o CVA.

## 2023-10-21 NOTE — CHART NOTE - NSCHARTNOTEFT_GEN_A_CORE
Patient seen and examined. Plan as as discussed w/ Dr. Baron: continue Remdesivir course, monitoring patient's respiratory status, renal function/LFTs, and hemodynamics; appreciate neurology's evaluation and recommendations -- plan for MRI as ordered; continue Meclizine; continue fall precautions and frequent neuro checks; monitor inflammatory markers.     John Long M.D.  Division of Hospital Medicine  Available on [employer] Teams

## 2023-10-21 NOTE — H&P ADULT - NSHPLABSRESULTS_GEN_ALL_CORE
LABS:                        12.6   5.91  )-----------( 225      ( 20 Oct 2023 18:50 )             37.5     10-20    141  |  101  |  13  ----------------------------<  135<H>  4.2   |  29  |  0.55    Ca    9.6      20 Oct 2023 18:50  Mg     2.2     10-20    TPro  7.3  /  Alb  4.4  /  TBili  0.5  /  DBili  x   /  AST  20  /  ALT  15  /  AlkPhos  80  10-20    PT/INR - ( 20 Oct 2023 18:50 )   PT: 11.0 sec;   INR: 1.05 ratio         PTT - ( 20 Oct 2023 18:50 )  PTT:28.4 sec      Urinalysis Basic - ( 20 Oct 2023 21:29 )    Color: Colorless / Appearance: Clear / S.050 / pH: x  Gluc: x / Ketone: Negative  / Bili: Negative / Urobili: Negative   Blood: x / Protein: Negative / Nitrite: Negative   Leuk Esterase: Negative / RBC: 0 /hpf / WBC 2 /HPF   Sq Epi: x / Non Sq Epi: x / Bacteria: Negative        SARS-CoV-2: Detected (20 Oct 2023 21:29)      IMAGING/ADDITIONAL TESTS:    EKG (10/20/23) personally reviewed: NSR, HR 81, QTc 441    CXR (10/23/23) personally reviewed: no focal consolidation appreciated    < from: CT Angio Neck / Brain Stroke Protocol  w/ IV Cont (10.20.23 @ 19:05) >  IMPRESSION:  CT angiography neck: No hemodynamically significant stenosis by NASCET   criteria. No vascular dissection.  CT angiography brain: No major vessel occlusion or proximal stenosis. No   evidence of aneurysm or other vascular malformation.  CT perfusion: Technically adequate study, no perfusion deficits.  --- End of Report ---  < end of copied text >    < from: CT Brain Stroke Protocol (10.20.23 @ 19:04) >  FINDINGS:  There is no intraparenchymal hematoma, mass effect or midline shift. The   basal cisterns are patent. No abnormal extra-axial fluid collections are   present.  There is a small hypoattenuation in the left frontal lobe at the   gray-white matter interface, series 2:18 without surrounding gliotic   changes.  Age-appropriate cerebral volume loss with proportional prominence of the   sulci and ventricles. Mild patchy periventricular white matter   hypoattenuation, likely the sequela of chronic microvascular changes.  The visualized paranasal sinuses are clear. The mastoid air cells and   middle ear cavities are clear. The intraorbital compartments are   unremarkable.  The soft tissues of the scalp are unremarkable. The calvarium is intact.    IMPRESSION:  No acute hemorrhage or midline shift.  Small hypoattenuation of the left frontal lobe at the gray-white matter   interface, this may represent an age-indeterminate embolic infarct.  Findings discussed with Dr. Marshall by Dr. Valladares at 6:50 PM on   10/20/2023.  --- End of Report --  < end of copied text >

## 2023-10-21 NOTE — H&P ADULT - PROBLEM SELECTOR PLAN 3
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

## 2023-10-21 NOTE — PHYSICAL THERAPY INITIAL EVALUATION ADULT - NSPTDISCHREC_GEN_A_CORE
HPT to increase overall strength and endurance. SHWETA tai./Home PT HPT to increase overall strength and endurance and continued assist from daughter at home with all ADLs. SHWETA tai./Home PT

## 2023-10-21 NOTE — PHYSICAL THERAPY INITIAL EVALUATION ADULT - BALANCE TRAINING, PT EVAL
GOAL: Pt will improve standing balance from good to normal by 1 grade to improve ease with ADLs in 2 weeks.

## 2023-10-21 NOTE — PATIENT PROFILE ADULT - FALL HARM RISK - HARM RISK INTERVENTIONS
Assistance with ambulation/Assistance OOB with selected safe patient handling equipment/Communicate Risk of Fall with Harm to all staff/Monitor gait and stability/Reinforce activity limits and safety measures with patient and family/Sit up slowly, dangle for a short time, stand at bedside before walking/Tailored Fall Risk Interventions/Visual Cue: Yellow wristband and red socks/Bed in lowest position, wheels locked, appropriate side rails in place/Call bell, personal items and telephone in reach/Instruct patient to call for assistance before getting out of bed or chair/Non-slip footwear when patient is out of bed/[place] to call system/Physically safe environment - no spills, clutter or unnecessary equipment/Purposeful Proactive Rounding/Room/bathroom lighting operational, light cord in reach

## 2023-10-21 NOTE — H&P ADULT - HISTORY OF PRESENT ILLNESS
83F Mandarin-speaking w/ hx of HLD, b/l legal blindness (unclear etiology), presenting with persistent dizziness since earlier yesterday morning. Reported that she suddenly felt room spinning and had 2 episodes of NBNB emesis. Endorsed having a cough for the past 1 wk. Denied f/c, headache, vision changes, CP, SOB, abdominal pain, dysuria, diarrhea, constipation. Was on ASA ~3-4 months ago, but was taking off of it by PCP.     INCOMPLETE    In ED: Afebrile, HR 70s, SBP 120s-130s, RR 16-18, sating 99% on RA. Labs grossly unremarkable, UA neg for infection. RVP positive for COVID-19. CXR (prelim) clear. CTH, CTA H/N were w/o acute findings. Admitted to Medicine for further management. 83F Mandarin-speaking w/ hx of HLD, b/l legal blindness (unclear etiology), presenting with persistent dizziness since earlier yesterday morning. Reported that she suddenly felt room spinning and had 2 episodes of NBNB emesis. Endorsed having a cough for the past 1 wk. Denied f/c, headache, vision changes, CP, SOB, abdominal pain, dysuria, diarrhea, constipation. Was on ASA ~3-4 months ago, but was taking off of it by PCP.     INCOMPLETE    In ED: Afebrile, HR 70s, SBP 120s-130s, RR 16-18, sating 99% on RA. Labs grossly unremarkable, UA neg for infection. RVP positive for COVID-19. CXR (prelim) clear. CTH, CTA H/N were w/o acute findings. Received meclizine 25mg, Zofran 4mg IV. Admitted to Medicine for further management. 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 Reported that she suddenly felt room spinning and had 2 episodes of NBNB emesis. Also endorsed having a cough for the past 1 wk. She noted her  at home also had a cough. Denied f/c, headache, vision/hearing changes, CP, SOB, abdominal pain, dysuria, diarrhea, constipation. Per chart review, pt was reportedly on ASA ~3-4 months ago, but was taking off of it by PCP.     In ED: Afebrile, HR 70s, SBP 120s-130s, RR 16-18, sating 99% on RA. Labs grossly unremarkable, UA neg for infection. RVP positive for COVID-19. CXR (prelim) clear. CTH, CTA H/N were w/o acute findings. Received meclizine 25mg, Zofran 4mg IV. Admitted to Medicine for further management. 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 Reported that she suddenly felt room spinning and had 2 episodes of NBNB emesis. Also endorsed having a productive cough for the past 1 wk. She noted her  at home also had a cough. Has diarrhea intermittently, but none in the past few  days. Denied f/c, headache, vision/hearing changes, CP, SOB, sore throat, abdominal pain, dysuria, constipation. Per chart review, pt was reportedly on ASA ~3-4 months ago, but was taking off of it by PCP. Of note, ED triage/nursing note reported left facial droop, not appreciated on exam and not mentioned in ED/neurology evaluation afterwards.    In ED: Afebrile, HR 70s, SBP 120s-130s, RR 16-18, sating 99% on RA. Labs grossly unremarkable, UA neg for infection. RVP positive for COVID-19. CXR (prelim) clear. CTH noted small hypoattenuation in L frontal lobe ("may represent age-indeterminate embolic infarct"), CTA H/N were w/o acute findings. Received meclizine 25mg, Zofran 4mg IV. Admitted to Medicine for further management.

## 2023-10-21 NOTE — H&P ADULT - PROBLEM SELECTOR PLAN 2
Found to be COVID-19 positive. Noted she has had productive cough for ~1 wk and now vertigo.  - c/w 3-day course of Remdesivir for now  - hold off on dexamethasone as pt is not hypoxic  - c/w symptomatic management  - airborne/contact isolation  - monitor respiratory status, currently sating well on RA

## 2023-10-21 NOTE — CHART NOTE - NSCHARTNOTEFT_GEN_A_CORE
Patient seen and examined at bedside with attending and neurology team. Please refer to attending attestation of neurology consult note from the day prior for final recommendations.    IMAGING     CT angiography neck: No hemodynamically significant stenosis by NASCET   criteria. No vascular dissection.    CT angiography brain: No major vessel occlusion or proximal stenosis. No   evidence of aneurysm or other vascular malformation.    CT perfusion: Technically adequate study, no perfusion deficits.    CT brain: No acute hemorrhage or midline shift.  Small hypoattenuation of the left frontal lobe at the gray-white matter   interface, this may represent an age-indeterminate embolic infarct.    IMPRESSION   Acute vestibular syndrome of unclear etiology likely peripheral etiology; intact vertebrobasilar system on cta to my eye. CTP negative. 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 Symptoms now improved     RECOMMENDATION   [] No neurological contraindication to discharge as patient is otherwise stable  [] Neurology to sign off for now, please call [phone] for further questions Patient seen and examined at bedside with attending and neurology team. Please refer to attending attestation of neurology consult note from the day prior for final recommendations.    IMAGING     CT angiography neck: No hemodynamically significant stenosis by NASCET   criteria. No vascular dissection.    CT angiography brain: No major vessel occlusion or proximal stenosis. No   evidence of aneurysm or other vascular malformation.    CT perfusion: Technically adequate study, no perfusion deficits.    CT brain: No acute hemorrhage or midline shift.  Small hypoattenuation of the left frontal lobe at the gray-white matter   interface, this may represent an age-indeterminate embolic infarct.    IMPRESSION   Acute vestibular syndrome of unclear etiology likely peripheral etiology; intact vertebrobasilar system on cta to my eye. CTP negative. 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 Symptoms now improved     RECOMMENDATION   [] No neurological contraindication to discharge as patient is otherwise stable  [] OK to continue aspirin 81mg PO Qd   [] Continue workup for COVID   [] Neurology to sign off for now, please call [phone] for further questions

## 2023-10-21 NOTE — PHYSICAL THERAPY INITIAL EVALUATION ADULT - PERTINENT HX OF CURRENT PROBLEM, REHAB EVAL
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 Reported that she suddenly felt room spinning and had 2 episodes of NBNB emesis. Also endorsed having a productive cough for the past 1 wk. She noted her  at home also had a cough. Has diarrhea intermittently, but none in the past few  days. Denied f/c, headache, vision/hearing changes, CP, SOB, sore throat, abdominal pain, dysuria, constipation. Per chart review, pt was reportedly on ASA ~3-4 months ago, but was taking off of it by PCP. Of note, ED triage/nursing note reported left facial droop, not appreciated on exam and not mentioned in ED/neurology evaluation afterwards.  CXR: Clear lungs.  CT Brain: No acute hemorrhage or midline shift. Small hypoattenuation of the left frontal lobe at the gray-white matter interface, this may represent an age-indeterminate embolic infarct.  CT angiography neck: No hemodynamically significant stenosis by NASCET criteria. No vascular dissection.  CT angiography brain: No major vessel occlusion or proximal stenosis. No evidence of aneurysm or other vascular malformation.  CT perfusion: Technically adequate study, no perfusion deficits.

## 2023-10-21 NOTE — PHYSICAL THERAPY INITIAL EVALUATION ADULT - ACTIVE RANGE OF MOTION EXAMINATION, REHAB EVAL
gabrielle. upper extremity Active ROM was WNL (within normal limits)/bilateral lower extremity Active ROM was WNL (within normal limits)

## 2023-10-21 NOTE — H&P ADULT - NSHPSOCIALHISTORY_GEN_ALL_CORE
Denied smoking cigarettes, drinking EtOH, or using illicit/recreational drugs. Ambulates with cane at baseline.

## 2023-10-21 NOTE — H&P ADULT - PROBLEM SELECTOR PLAN 1
Presented with sudden persistent vertigo x1 day a/w nausea/vomiting. No new focal neurological deficits elicited on exam. CTH noted small hypoattenuation in L frontal lobe ("may represent age-indeterminate embolic infarct"). CTA H/N were w/o acute findings. Also found to be COVID-19 positive. Suspect more likely 2/2 COVID-19 infection vs less likely CVA at this time.  - Appreciate Neurology impression/recs: "Acute vestibular syndrome likely peripheral etiology; chronic left CR infarct non contributory to current symptoms"  - c/w ASA and statin for now  - c/w zofran IV q6h PRN for n/v  - c/w meclizine 25mg BID PRN for dizziness  - can also try clonazepam 0.5mg q8h PRN (for 2-3 days) per Neuro  - check orthostatics; if positive administer IVF  - maintain fall/aspiration precautions  - f/u MRI brain w/o to r/o CVA Presented with sudden persistent vertigo x1 day a/w nausea/vomiting. No new focal neurological deficits elicited on exam. CTH noted small hypoattenuation in L frontal lobe ("may represent age-indeterminate embolic infarct"). CTA H/N were w/o acute findings. Also found to be COVID-19 positive. Suspect more likely 2/2 COVID-19 infection vs less likely CVA at this time.  - Appreciate Neurology impression/recs: "Acute vestibular syndrome likely peripheral etiology; chronic left CR infarct non contributory to current symptoms"  - c/w ASA and statin for now  - c/w zofran IV q6h PRN for n/v  - c/w meclizine 25mg BID PRN for dizziness  - can also try clonazepam 0.5mg q8h PRN (for 2-3 days) per Neuro  - check orthostatics; if positive administer IVF  - maintain fall/aspiration precautions  - PT eval  - f/u MRI brain w/o to r/o CVA

## 2023-10-21 NOTE — H&P ADULT - NSHPPHYSICALEXAM_GEN_ALL_CORE
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); pt able to discern number of fingers held at ~6 inches in L eye (chronic issue per pt)  ENMT: Moist oral mucosa, white spots noted on tongue; no facial droop appreciated  NECK: Supple, no palpable masses  RESPIRATORY: Normal respiratory effort; lungs are clear to auscultation bilaterally; No wheezes or rales  CARDIOVASCULAR: Regular rate and rhythm; Normal S1 and S2; No murmurs, rubs, or gallops; No lower extremity edema; Peripheral pulses are 2+ bilaterally  ABDOMEN: Soft, Nontender, Nondistended; Bowel sounds present  MUSCULOSKELETAL:  No clubbing or cyanosis of digits; No joint swelling or tenderness to palpation  PSYCH: AAOx3 (oriented to person, place, and year); affect appropriate  NEUROLOGY: Aside from above eye findings, CN II-XII are grossly intact; no gross sensory deficits; moving all extremities spontaneously; strength testing grossly 5/5 throughout  SKIN: No rashes; No palpable lesions

## 2023-10-21 NOTE — PHYSICAL THERAPY INITIAL EVALUATION ADULT - ADDITIONAL COMMENTS
pt lives in a private home with daughter. pt has no stairs to enter and 1 level inside. pt was independent prior with all ADLs and reports daughter helps around the house throughout the day with ADLs. pt reports ambulating with a cane and owns shower chair/grab bars.

## 2023-10-21 NOTE — H&P ADULT - TIME BILLING
reviewing prior documentation, independently obtaining a history and interpreting results of tests, performing a physical examination, reviewing tests/imaging, discussing the plan with the patient, ordering medications/tests, documenting clinical information in the electronic health record, and coordinating care with staff.

## 2023-10-21 NOTE — H&P ADULT - PROBLEM SELECTOR PLAN 5
- DVT ppx; Lovenox  - Diet: DASH  - Dispo: pending improvement/further workup - DVT ppx; Lovenox  - Diet: DASH  - Dispo: pending improvement/further workup, PT eval

## 2023-10-21 NOTE — PHYSICAL THERAPY INITIAL EVALUATION ADULT - GENERAL OBSERVATIONS, REHAB EVAL
pt received pt received supine in bed, A&0x4, Mandarin speaking, +COVID, following 100% multi-step commands

## 2023-10-21 NOTE — H&P ADULT - PROBLEM/PLAN-1
LOV:6/28/18 TB  FOV:none on file   LAST RX:6/26/18 5 mg 1 tab daily 0 refills   LAST LABS:5/1/18 lipids   PER PROTOCOL: to provider DISPLAY PLAN FREE TEXT

## 2023-10-21 NOTE — PHYSICAL THERAPY INITIAL EVALUATION ADULT - NSPTDMEREC_GEN_A_CORE
pt will require rolling walker at home due to their overall decreased endurance and increased stability required to complete MRADL's/rolling walker

## 2023-10-22 ENCOUNTER — TRANSCRIPTION ENCOUNTER (OUTPATIENT)
Age: 83
End: 2023-10-22

## 2023-10-22 LAB
ALBUMIN SERPL ELPH-MCNC: 3.6 G/DL — SIGNIFICANT CHANGE UP (ref 3.3–5)
ALBUMIN SERPL ELPH-MCNC: 3.6 G/DL — SIGNIFICANT CHANGE UP (ref 3.3–5)
ALP SERPL-CCNC: 62 U/L — SIGNIFICANT CHANGE UP (ref 40–120)
ALP SERPL-CCNC: 62 U/L — SIGNIFICANT CHANGE UP (ref 40–120)
ALT FLD-CCNC: 11 U/L — SIGNIFICANT CHANGE UP (ref 10–45)
ALT FLD-CCNC: 11 U/L — SIGNIFICANT CHANGE UP (ref 10–45)
ANION GAP SERPL CALC-SCNC: 9 MMOL/L — SIGNIFICANT CHANGE UP (ref 5–17)
ANION GAP SERPL CALC-SCNC: 9 MMOL/L — SIGNIFICANT CHANGE UP (ref 5–17)
AST SERPL-CCNC: 16 U/L — SIGNIFICANT CHANGE UP (ref 10–40)
AST SERPL-CCNC: 16 U/L — SIGNIFICANT CHANGE UP (ref 10–40)
BILIRUB SERPL-MCNC: 0.4 MG/DL — SIGNIFICANT CHANGE UP (ref 0.2–1.2)
BILIRUB SERPL-MCNC: 0.4 MG/DL — SIGNIFICANT CHANGE UP (ref 0.2–1.2)
BUN SERPL-MCNC: 10 MG/DL — SIGNIFICANT CHANGE UP (ref 7–23)
BUN SERPL-MCNC: 10 MG/DL — SIGNIFICANT CHANGE UP (ref 7–23)
CALCIUM SERPL-MCNC: 8.5 MG/DL — SIGNIFICANT CHANGE UP (ref 8.4–10.5)
CALCIUM SERPL-MCNC: 8.5 MG/DL — SIGNIFICANT CHANGE UP (ref 8.4–10.5)
CHLORIDE SERPL-SCNC: 108 MMOL/L — SIGNIFICANT CHANGE UP (ref 96–108)
CHLORIDE SERPL-SCNC: 108 MMOL/L — SIGNIFICANT CHANGE UP (ref 96–108)
CO2 SERPL-SCNC: 24 MMOL/L — SIGNIFICANT CHANGE UP (ref 22–31)
CO2 SERPL-SCNC: 24 MMOL/L — SIGNIFICANT CHANGE UP (ref 22–31)
CREAT SERPL-MCNC: 0.57 MG/DL — SIGNIFICANT CHANGE UP (ref 0.5–1.3)
CREAT SERPL-MCNC: 0.57 MG/DL — SIGNIFICANT CHANGE UP (ref 0.5–1.3)
D DIMER BLD IA.RAPID-MCNC: 478 NG/ML DDU — HIGH
D DIMER BLD IA.RAPID-MCNC: 478 NG/ML DDU — HIGH
EGFR: 90 ML/MIN/1.73M2 — SIGNIFICANT CHANGE UP
EGFR: 90 ML/MIN/1.73M2 — SIGNIFICANT CHANGE UP
GLUCOSE SERPL-MCNC: 95 MG/DL — SIGNIFICANT CHANGE UP (ref 70–99)
GLUCOSE SERPL-MCNC: 95 MG/DL — SIGNIFICANT CHANGE UP (ref 70–99)
HCT VFR BLD CALC: 34.5 % — SIGNIFICANT CHANGE UP (ref 34.5–45)
HCT VFR BLD CALC: 34.5 % — SIGNIFICANT CHANGE UP (ref 34.5–45)
HGB BLD-MCNC: 11.5 G/DL — SIGNIFICANT CHANGE UP (ref 11.5–15.5)
HGB BLD-MCNC: 11.5 G/DL — SIGNIFICANT CHANGE UP (ref 11.5–15.5)
MAGNESIUM SERPL-MCNC: 2.1 MG/DL — SIGNIFICANT CHANGE UP (ref 1.6–2.6)
MAGNESIUM SERPL-MCNC: 2.1 MG/DL — SIGNIFICANT CHANGE UP (ref 1.6–2.6)
MCHC RBC-ENTMCNC: 30.1 PG — SIGNIFICANT CHANGE UP (ref 27–34)
MCHC RBC-ENTMCNC: 30.1 PG — SIGNIFICANT CHANGE UP (ref 27–34)
MCHC RBC-ENTMCNC: 33.3 GM/DL — SIGNIFICANT CHANGE UP (ref 32–36)
MCHC RBC-ENTMCNC: 33.3 GM/DL — SIGNIFICANT CHANGE UP (ref 32–36)
MCV RBC AUTO: 90.3 FL — SIGNIFICANT CHANGE UP (ref 80–100)
MCV RBC AUTO: 90.3 FL — SIGNIFICANT CHANGE UP (ref 80–100)
NRBC # BLD: 0 /100 WBCS — SIGNIFICANT CHANGE UP (ref 0–0)
NRBC # BLD: 0 /100 WBCS — SIGNIFICANT CHANGE UP (ref 0–0)
PHOSPHATE SERPL-MCNC: 2.5 MG/DL — SIGNIFICANT CHANGE UP (ref 2.5–4.5)
PHOSPHATE SERPL-MCNC: 2.5 MG/DL — SIGNIFICANT CHANGE UP (ref 2.5–4.5)
PLATELET # BLD AUTO: 181 K/UL — SIGNIFICANT CHANGE UP (ref 150–400)
PLATELET # BLD AUTO: 181 K/UL — SIGNIFICANT CHANGE UP (ref 150–400)
POTASSIUM SERPL-MCNC: 4.1 MMOL/L — SIGNIFICANT CHANGE UP (ref 3.5–5.3)
POTASSIUM SERPL-MCNC: 4.1 MMOL/L — SIGNIFICANT CHANGE UP (ref 3.5–5.3)
POTASSIUM SERPL-SCNC: 4.1 MMOL/L — SIGNIFICANT CHANGE UP (ref 3.5–5.3)
POTASSIUM SERPL-SCNC: 4.1 MMOL/L — SIGNIFICANT CHANGE UP (ref 3.5–5.3)
PROT SERPL-MCNC: 5.9 G/DL — LOW (ref 6–8.3)
PROT SERPL-MCNC: 5.9 G/DL — LOW (ref 6–8.3)
RBC # BLD: 3.82 M/UL — SIGNIFICANT CHANGE UP (ref 3.8–5.2)
RBC # BLD: 3.82 M/UL — SIGNIFICANT CHANGE UP (ref 3.8–5.2)
RBC # FLD: 13.4 % — SIGNIFICANT CHANGE UP (ref 10.3–14.5)
RBC # FLD: 13.4 % — SIGNIFICANT CHANGE UP (ref 10.3–14.5)
SODIUM SERPL-SCNC: 141 MMOL/L — SIGNIFICANT CHANGE UP (ref 135–145)
SODIUM SERPL-SCNC: 141 MMOL/L — SIGNIFICANT CHANGE UP (ref 135–145)
WBC # BLD: 4.48 K/UL — SIGNIFICANT CHANGE UP (ref 3.8–10.5)
WBC # BLD: 4.48 K/UL — SIGNIFICANT CHANGE UP (ref 3.8–10.5)
WBC # FLD AUTO: 4.48 K/UL — SIGNIFICANT CHANGE UP (ref 3.8–10.5)
WBC # FLD AUTO: 4.48 K/UL — SIGNIFICANT CHANGE UP (ref 3.8–10.5)

## 2023-10-22 PROCEDURE — 99232 SBSQ HOSP IP/OBS MODERATE 35: CPT

## 2023-10-22 RX ORDER — MECLIZINE HCL 12.5 MG
1 TABLET ORAL
Qty: 60 | Refills: 0
Start: 2023-10-22 | End: 2023-11-20

## 2023-10-22 RX ORDER — ASPIRIN/CALCIUM CARB/MAGNESIUM 324 MG
1 TABLET ORAL
Qty: 30 | Refills: 0
Start: 2023-10-22 | End: 2023-11-20

## 2023-10-22 RX ADMIN — Medication 1 TABLET(S): at 11:13

## 2023-10-22 RX ADMIN — REMDESIVIR 200 MILLIGRAM(S): 5 INJECTION INTRAVENOUS at 11:13

## 2023-10-22 RX ADMIN — SIMVASTATIN 5 MILLIGRAM(S): 20 TABLET, FILM COATED ORAL at 21:53

## 2023-10-22 RX ADMIN — CHLORHEXIDINE GLUCONATE 1 APPLICATION(S): 213 SOLUTION TOPICAL at 12:12

## 2023-10-22 RX ADMIN — ENOXAPARIN SODIUM 40 MILLIGRAM(S): 100 INJECTION SUBCUTANEOUS at 07:02

## 2023-10-22 RX ADMIN — Medication 81 MILLIGRAM(S): at 11:13

## 2023-10-22 NOTE — DISCHARGE NOTE PROVIDER - NSDCCPCAREPLAN_GEN_ALL_CORE_FT
PRINCIPAL DISCHARGE DIAGNOSIS  Diagnosis: Vertigo  Assessment and Plan of Treatment: Improved with meclizine, seen by neurology, no intervention  Resolved  Follow up with PCP      SECONDARY DISCHARGE DIAGNOSES  Diagnosis: 2019 novel coronavirus disease (COVID-19)  Assessment and Plan of Treatment: Treated with Remdesivir x 3 days   Improved  Follow up with PCP   You tested positive for COVID 19.  You no longer require hospitalization.  Please restrict activities outside of your home except for getting medical care.  Do not go to work, school, or public areas.  Avoid using public transportation, ride-sharing, or taxis.  Separate yourself from other people and animals in your home.  Call ahead before visiting your doctor.  Wear a facemask when you are around other people. Cover your cough and sneezes.  Clean your hands often.  Avoid sharing personal household items.  Clean all frequently touched surfaces daily.

## 2023-10-22 NOTE — DISCHARGE NOTE PROVIDER - HOSPITAL COURSE
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 Reported that she suddenly felt room spinning and had 2 episodes of NBNB emesis. Also endorsed having a productive cough for the past 1 wk. She noted her  at home also had a cough. Has diarrhea intermittently, but none in the past few  days. Denied f/c, headache, vision/hearing changes, CP, SOB, sore throat, abdominal pain, dysuria, constipation. Per chart review, pt was reportedly on ASA ~3-4 months ago, but was taking off of it by PCP. Of note, ED triage/nursing note reported left facial droop, not appreciated on exam and not mentioned in ED/neurology evaluation afterwards.    In ED: Afebrile, HR 70s, SBP 120s-130s, RR 16-18, sating 99% on RA. Labs grossly unremarkable, UA neg for infection. RVP positive for COVID-19. CXR (prelim) clear. CTH noted small hypoattenuation in L frontal lobe ("may represent age-indeterminate embolic infarct"), CTA H/N were w/o acute findings. Received meclizine 25mg, Zofran 4mg IV. Admitted to Medicine for further management. (21 Oct 2023 02:59)    Hospital Course:  Patient admitted for dizziness, seen by neurology, CTA head and neck negative, continue ASA as per neuro.   For COVID, patient was treated with RDV x 3 days with notable improvement, no decadron as patient did not experience hypoxia.     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 Reported that she suddenly felt room spinning and had 2 episodes of NBNB emesis. Also endorsed having a productive cough for the past 1 wk. She noted her  at home also had a cough. Has diarrhea intermittently, but none in the past few  days. Denied f/c, headache, vision/hearing changes, CP, SOB, sore throat, abdominal pain, dysuria, constipation. Per chart review, pt was reportedly on ASA ~3-4 months ago, but was taking off of it by PCP. Of note, ED triage/nursing note reported left facial droop, not appreciated on exam and not mentioned in ED/neurology evaluation afterwards.    In ED: Afebrile, HR 70s, SBP 120s-130s, RR 16-18, sating 99% on RA. Labs grossly unremarkable, UA neg for infection. RVP positive for COVID-19. CXR (prelim) clear. CTH noted small hypoattenuation in L frontal lobe ("may represent age-indeterminate embolic infarct"), CTA H/N were w/o acute findings. Received meclizine 25mg, Zofran 4mg IV. Admitted to Medicine for further management. (21 Oct 2023 02:59)    Hospital Course:  Patient admitted for dizziness, seen by neurology, CTA head and neck negative, continue ASA as per neuro.   For COVID, patient was treated with RDV x 3 days with notable improvement, no decadron as patient did not experience hypoxia.     Important Medication Changes and Reason:    Active or Pending Issues Requiring Follow-up:  PCP    Advanced Directives:   [ X] Full code  [ ] DNR  [ ] Hospice    Discharge Diagnoses:  COVID 19  Vertigo    Discharge/dispo/med rec discussed with attending Dr. Gibson. 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 Reported that she suddenly felt room spinning and had 2 episodes of NBNB emesis. Also endorsed having a productive cough for the past 1 wk. She noted her  at home also had a cough. Has diarrhea intermittently, but none in the past few  days. Denied f/c, headache, vision/hearing changes, CP, SOB, sore throat, abdominal pain, dysuria, constipation. Per chart review, pt was reportedly on ASA ~3-4 months ago, but was taking off of it by PCP. Of note, ED triage/nursing note reported left facial droop, not appreciated on exam and not mentioned in ED/neurology evaluation afterwards.    In ED: Afebrile, HR 70s, SBP 120s-130s, RR 16-18, sating 99% on RA. Labs grossly unremarkable, UA neg for infection. RVP positive for COVID-19. CXR (prelim) clear. CTH noted small hypoattenuation in L frontal lobe ("may represent age-indeterminate embolic infarct"), CTA H/N were w/o acute findings. Received meclizine 25mg, Zofran 4mg IV. Admitted to Medicine for further management. (21 Oct 2023 02:59)    Hospital Course:  Patient admitted for dizziness, seen by neurology, CTA head and neck negative, continue ASA as per neuro.   For COVID, patient was treated with RDV x 3 days with notable improvement, no decadron as patient did not experience hypoxia.     Important Medication Changes and Reason:  >N/A    Active or Pending Issues Requiring Follow-up:  >PCP follow up within one week for further outpatient management     Advanced Directives:   [ X] Full code  [ ] DNR  [ ] Hospice    Discharge Diagnoses:  COVID 19  Vertigo    Discharge/dispo/med rec discussed with attending Dr. Gibson. Patient is medically cleared for discharge with outpatient follow up

## 2023-10-22 NOTE — PROGRESS NOTE ADULT - SUBJECTIVE AND OBJECTIVE BOX
INCOMPLETE NOTE    John Long M.D.  Division of Hospital Medicine  Available on [employer] TEAMS    SUBJECTIVE / ACUTE INTERVAL EVENTS: Patient seen and examined. No overnight events. No subjective complaints.     OBJECTIVE:   Vital Signs Last 24 Hrs  T(F): 98.6 (21 Oct 2023 11:13), Max: 98.6 (21 Oct 2023 11:13)  HR: 79 (21 Oct 2023 14:22) (73 - 79)  BP: 108/68 (21 Oct 2023 14:22) (108/68 - 120/69)  RR: 18 (21 Oct 2023 11:13) (18 - 18)  SpO2: 97% (21 Oct 2023 14:22) (95% - 97%)    Parameters below as of 21 Oct 2023 14:22  Patient On (Oxygen Delivery Method): room air    Physical Examination:  GEN: elderly woman, laying in bed in NAD  PSYCH: A&Ox3, mood and affect appear appropriate   NEURO: no focal neurologic deficits appreciated; CN II-XII intact, sensation intact B/L, motor 5/5 in all mm groups  EYES: PERRL, anicteric, EOMI, no vertical/horizontal nystagmus  RESPI: no accessory muscle use, B/L air entry, CTAB   CARDIO: regular rate/rhythm, no LE edema B/L  ABD: soft, NT, ND  EXT: patient able to move all extremities spontaneously  VASC: peripheral pulses palpated    Labs:                      11.5   4.48  )-----------( 181      ( 22 Oct 2023 06:12 )             34.5     10-22  141  |  108  |  10  ----------------------------<  95  4.1   |  24  |  0.57    Ca    8.5      22 Oct 2023 06:12  Phos  2.5     10-22  Mg     2.1     10-22    TPro  5.9<L>  /  Alb  3.6  /  TBili  0.4  /  DBili  x   /  AST  16  /  ALT  11  /  AlkPhos  62  10-22    PT/INR - ( 20 Oct 2023 18:50 )   PT: 11.0 sec;   INR: 1.05 ratio    PTT - ( 20 Oct 2023 18:50 )  PTT:28.4 sec    Urinalysis Basic - ( 22 Oct 2023 06:12 )  Color: x / Appearance: x / SG: x / pH: x  Gluc: 95 mg/dL / Ketone: x  / Bili: x / Urobili: x   Blood: x / Protein: x / Nitrite: x   Leuk Esterase: x / RBC: x / WBC x   Sq Epi: x / Non Sq Epi: x / Bacteria: x    Imaging Personally Reviewed:  - MRI Head w/o contrast as reported: No MRI evidence of acute intracranial pathology. Patchy T2 FLAIR signal hyperintensity in the hemispheric white matter   bilaterally is nonspecific in etiology but likely reflects chronic microvascular ischemic disease in this age group. A nonspecific 1.7 x 1 cm focus of T2/T2 FLAIR signal hyperintensity in the subcortical white matter of the left frontal convexity posteriorly corresponds to the hypoattenuating focus on CT scan.  This most likely represents chronic microvascular ischemic disease or other focus of encephalomalacia/gliosis.  In the proper clinical scenario, demyelinating   disease may have a similar appearance.  The finding does not appear to be masslike or represent vasogenic edema, however it is incompletely characterized without IV contrast.    Consultant(s) Notes Reviewed: Neurology  Care Discussed with Consultants/Other Providers: ACP Kiki Paulino    MEDICATIONS  (STANDING):  aspirin enteric coated 81 milliGRAM(s) Oral daily  chlorhexidine 2% Cloths 1 Application(s) Topical daily  enoxaparin Injectable 40 milliGRAM(s) SubCutaneous every 24 hours  multivitamin 1 Tablet(s) Oral daily  remdesivir  IVPB   IV Intermittent   remdesivir  IVPB 100 milliGRAM(s) IV Intermittent every 24 hours  simvastatin 5 milliGRAM(s) Oral at bedtime    MEDICATIONS  (PRN):  acetaminophen     Tablet .. 650 milliGRAM(s) Oral every 6 hours PRN Temp greater or equal to 38C (100.4F), Mild Pain (1 - 3)  guaiFENesin Oral Liquid (Sugar-Free) 200 milliGRAM(s) Oral every 6 hours PRN Cough  meclizine 25 milliGRAM(s) Oral two times a day PRN Dizziness John Long M.D.  Division of Hospital Medicine  Available on [employer] TEAMS    SUBJECTIVE / ACUTE INTERVAL EVENTS: Patient seen and examined. No overnight events. No subjective complaints. Translation for Mandarin via [employer].     OBJECTIVE:   Vital Signs Last 24 Hrs  T(F): 98.6 (21 Oct 2023 11:13), Max: 98.6 (21 Oct 2023 11:13)  HR: 79 (21 Oct 2023 14:22) (73 - 79)  BP: 108/68 (21 Oct 2023 14:22) (108/68 - 120/69)  RR: 18 (21 Oct 2023 11:13) (18 - 18)  SpO2: 97% (21 Oct 2023 14:22) (95% - 97%)    Parameters below as of 21 Oct 2023 14:22  Patient On (Oxygen Delivery Method): room air    Physical Examination:  GEN: elderly woman, sitting up in bed in NAD  PSYCH: A&Ox3, mood and affect appear appropriate   NEURO: no focal neurologic deficits appreciated; CN II-XII intact, sensation intact B/L, motor 5/5 in all mm groups  EYES: PERRL, anicteric, EOMI, no vertical/horizontal nystagmus  RESPI: no accessory muscle use, B/L air entry, CTAB   CARDIO: regular rate/rhythm, no LE edema B/L  ABD: soft, NT, ND  EXT: patient able to move all extremities spontaneously  VASC: peripheral pulses palpated    Labs:                      11.5   4.48  )-----------( 181      ( 22 Oct 2023 06:12 )             34.5     10-22  141  |  108  |  10  ----------------------------<  95  4.1   |  24  |  0.57    Ca    8.5      22 Oct 2023 06:12  Phos  2.5     10-22  Mg     2.1     10-22    TPro  5.9<L>  /  Alb  3.6  /  TBili  0.4  /  DBili  x   /  AST  16  /  ALT  11  /  AlkPhos  62  10-22    PT/INR - ( 20 Oct 2023 18:50 )   PT: 11.0 sec;   INR: 1.05 ratio    PTT - ( 20 Oct 2023 18:50 )  PTT:28.4 sec    Urinalysis Basic - ( 22 Oct 2023 06:12 )  Color: x / Appearance: x / SG: x / pH: x  Gluc: 95 mg/dL / Ketone: x  / Bili: x / Urobili: x   Blood: x / Protein: x / Nitrite: x   Leuk Esterase: x / RBC: x / WBC x   Sq Epi: x / Non Sq Epi: x / Bacteria: x    Imaging Personally Reviewed:  - MRI Head w/o contrast as reported: No MRI evidence of acute intracranial pathology. Patchy T2 FLAIR signal hyperintensity in the hemispheric white matter   bilaterally is nonspecific in etiology but likely reflects chronic microvascular ischemic disease in this age group. A nonspecific 1.7 x 1 cm focus of T2/T2 FLAIR signal hyperintensity in the subcortical white matter of the left frontal convexity posteriorly corresponds to the hypoattenuating focus on CT scan.  This most likely represents chronic microvascular ischemic disease or other focus of encephalomalacia/gliosis.  In the proper clinical scenario, demyelinating   disease may have a similar appearance.  The finding does not appear to be masslike or represent vasogenic edema, however it is incompletely characterized without IV contrast.    Consultant(s) Notes Reviewed: Neurology  Care Discussed with Consultants/Other Providers: LUKE Paulino    MEDICATIONS  (STANDING):  aspirin enteric coated 81 milliGRAM(s) Oral daily  chlorhexidine 2% Cloths 1 Application(s) Topical daily  enoxaparin Injectable 40 milliGRAM(s) SubCutaneous every 24 hours  multivitamin 1 Tablet(s) Oral daily  remdesivir  IVPB   IV Intermittent   remdesivir  IVPB 100 milliGRAM(s) IV Intermittent every 24 hours  simvastatin 5 milliGRAM(s) Oral at bedtime    MEDICATIONS  (PRN):  acetaminophen     Tablet .. 650 milliGRAM(s) Oral every 6 hours PRN Temp greater or equal to 38C (100.4F), Mild Pain (1 - 3)  guaiFENesin Oral Liquid (Sugar-Free) 200 milliGRAM(s) Oral every 6 hours PRN Cough  meclizine 25 milliGRAM(s) Oral two times a day PRN Dizziness

## 2023-10-22 NOTE — DISCHARGE NOTE PROVIDER - NSDCFUADDAPPT_GEN_ALL_CORE_FT
APPTS ARE READY TO BE MADE: [ ] YES    Best Family or Patient Contact (if needed):    Additional Information about above appointments (if needed):    1: PCP  2:   3:     Other comments or requests:    APPTS ARE READY TO BE MADE: [X] YES    Best Family or Patient Contact (if needed):    Additional Information about above appointments (if needed):    1: PCP  2:   3:     Other comments or requests:    APPTS ARE READY TO BE MADE: [X] YES    Best Family or Patient Contact (if needed):    Additional Information about above appointments (if needed):    1: PCP  2:   3:     Other comments or requests:     Pt is marie with Dr. Gujaardo on 10/26, [address] [facility].

## 2023-10-22 NOTE — DISCHARGE NOTE PROVIDER - CARE PROVIDER_API CALL
Ketty Guajardo  [facility]  [address] [facility]  [place] [address]  Phone: [phone]  Fax: [phone]  Follow Up Time: 1 week

## 2023-10-22 NOTE — DISCHARGE NOTE PROVIDER - NSDCMRMEDTOKEN_GEN_ALL_CORE_FT
Multiple Vitamins oral tablet: 1 tab(s) orally once a day  simvastatin 5 mg oral tablet: 1 tab(s) orally once a day  Vitamins C, B, D, E:    Multiple Vitamins oral tablet: 1 tab(s) orally once a day  Rolling Walker: Use As Directed  simvastatin 5 mg oral tablet: 1 tab(s) orally once a day  Vitamins C, B, D, E:

## 2023-10-23 ENCOUNTER — TRANSCRIPTION ENCOUNTER (OUTPATIENT)
Age: 83
End: 2023-10-23

## 2023-10-23 VITALS — WEIGHT: 114.86 LBS

## 2023-10-23 LAB
-  AMIKACIN: SIGNIFICANT CHANGE UP
-  AMIKACIN: SIGNIFICANT CHANGE UP
-  AMOXICILLIN/CLAVULANIC ACID: SIGNIFICANT CHANGE UP
-  AMOXICILLIN/CLAVULANIC ACID: SIGNIFICANT CHANGE UP
-  AMPICILLIN/SULBACTAM: SIGNIFICANT CHANGE UP
-  AMPICILLIN/SULBACTAM: SIGNIFICANT CHANGE UP
-  AMPICILLIN: SIGNIFICANT CHANGE UP
-  AMPICILLIN: SIGNIFICANT CHANGE UP
-  AZTREONAM: SIGNIFICANT CHANGE UP
-  AZTREONAM: SIGNIFICANT CHANGE UP
-  CEFAZOLIN: SIGNIFICANT CHANGE UP
-  CEFAZOLIN: SIGNIFICANT CHANGE UP
-  CEFEPIME: SIGNIFICANT CHANGE UP
-  CEFEPIME: SIGNIFICANT CHANGE UP
-  CEFOXITIN: SIGNIFICANT CHANGE UP
-  CEFOXITIN: SIGNIFICANT CHANGE UP
-  CEFTRIAXONE: SIGNIFICANT CHANGE UP
-  CEFTRIAXONE: SIGNIFICANT CHANGE UP
-  CEFUROXIME: SIGNIFICANT CHANGE UP
-  CEFUROXIME: SIGNIFICANT CHANGE UP
-  CIPROFLOXACIN: SIGNIFICANT CHANGE UP
-  CIPROFLOXACIN: SIGNIFICANT CHANGE UP
-  ERTAPENEM: SIGNIFICANT CHANGE UP
-  ERTAPENEM: SIGNIFICANT CHANGE UP
-  GENTAMICIN: SIGNIFICANT CHANGE UP
-  GENTAMICIN: SIGNIFICANT CHANGE UP
-  IMIPENEM: SIGNIFICANT CHANGE UP
-  IMIPENEM: SIGNIFICANT CHANGE UP
-  LEVOFLOXACIN: SIGNIFICANT CHANGE UP
-  LEVOFLOXACIN: SIGNIFICANT CHANGE UP
-  MEROPENEM: SIGNIFICANT CHANGE UP
-  MEROPENEM: SIGNIFICANT CHANGE UP
-  NITROFURANTOIN: SIGNIFICANT CHANGE UP
-  NITROFURANTOIN: SIGNIFICANT CHANGE UP
-  PIPERACILLIN/TAZOBACTAM: SIGNIFICANT CHANGE UP
-  PIPERACILLIN/TAZOBACTAM: SIGNIFICANT CHANGE UP
-  TOBRAMYCIN: SIGNIFICANT CHANGE UP
-  TOBRAMYCIN: SIGNIFICANT CHANGE UP
-  TRIMETHOPRIM/SULFAMETHOXAZOLE: SIGNIFICANT CHANGE UP
-  TRIMETHOPRIM/SULFAMETHOXAZOLE: SIGNIFICANT CHANGE UP
CULTURE RESULTS: SIGNIFICANT CHANGE UP
CULTURE RESULTS: SIGNIFICANT CHANGE UP
METHOD TYPE: SIGNIFICANT CHANGE UP
METHOD TYPE: SIGNIFICANT CHANGE UP
ORGANISM # SPEC MICROSCOPIC CNT: SIGNIFICANT CHANGE UP
SPECIMEN SOURCE: SIGNIFICANT CHANGE UP
SPECIMEN SOURCE: SIGNIFICANT CHANGE UP

## 2023-10-23 PROCEDURE — 99239 HOSP IP/OBS DSCHRG MGMT >30: CPT

## 2023-10-23 RX ADMIN — ENOXAPARIN SODIUM 40 MILLIGRAM(S): 100 INJECTION SUBCUTANEOUS at 05:42

## 2023-10-23 RX ADMIN — REMDESIVIR 200 MILLIGRAM(S): 5 INJECTION INTRAVENOUS at 11:02

## 2023-10-23 RX ADMIN — Medication 1 TABLET(S): at 11:03

## 2023-10-23 RX ADMIN — Medication 81 MILLIGRAM(S): at 11:03

## 2023-10-23 RX ADMIN — CHLORHEXIDINE GLUCONATE 1 APPLICATION(S): 213 SOLUTION TOPICAL at 11:05

## 2023-10-23 NOTE — DIETITIAN INITIAL EVALUATION ADULT - NSFNSGIIOFT_GEN_A_CORE
- no noted GI distress at this time per RN  - Last BM:this am; not currently ordered for bowel regimen

## 2023-10-23 NOTE — DIETITIAN INITIAL EVALUATION ADULT - PERTINENT LABORATORY DATA
10-22    141  |  108  |  10  ----------------------------<  95  4.1   |  24  |  0.57    Ca    8.5      22 Oct 2023 06:12  Phos  2.5     10-22  Mg     2.1     10-22    TPro  5.9<L>  /  Alb  3.6  /  TBili  0.4  /  DBili  x   /  AST  16  /  ALT  11  /  AlkPhos  62  10-22  A1C with Estimated Average Glucose Result: 5.8 % (10-21-23 @ 06:22)   10-22    141  |  108  |  10  ----------------------------<  95  4.1   |  24  |  0.57    Ca    8.5      22 Oct 2023 06:12  Phos  2.5     10-22  Mg     2.1     10-22    TPro  5.9<L>  /  Alb  3.6  /  TBili  0.4  /  DBili  x   /  AST  16  /  ALT  11  /  AlkPhos  62  10-22    A1C with Estimated Average Glucose Result: 5.8 % (10-21-23 @ 06:22)

## 2023-10-23 NOTE — DIETITIAN INITIAL EVALUATION ADULT - PROBLEM SELECTOR PLAN 1
Presented with sudden persistent vertigo x1 day a/w nausea/vomiting. No new focal neurological deficits elicited on exam. CTH noted small hypoattenuation in L frontal lobe ("may represent age-indeterminate embolic infarct"). CTA H/N were w/o acute findings. Also found to be COVID-19 positive. Suspect more likely 2/2 COVID-19 infection vs less likely CVA at this time.  - Appreciate Neurology impression/recs: "Acute vestibular syndrome likely peripheral etiology; chronic left CR infarct non contributory to current symptoms"  - c/w ASA and statin for now  - c/w zofran IV q6h PRN for n/v  - c/w meclizine 25mg BID PRN for dizziness  - can also try clonazepam 0.5mg q8h PRN (for 2-3 days) per Neuro  - check orthostatics; if positive administer IVF  - maintain fall/aspiration precautions  - PT eval  - f/u MRI brain w/o to r/o CVA

## 2023-10-23 NOTE — DISCHARGE NOTE NURSING/CASE MANAGEMENT/SOCIAL WORK - NSDCPEFALRISK_GEN_ALL_CORE
For information on Fall & Injury Prevention, visit: [email or web address] OR  [email or web address] OR  https://www.cdc.gov/steadi/patient.html

## 2023-10-23 NOTE — DIETITIAN INITIAL EVALUATION ADULT - NS FNS DIET ORDER
Diet, Regular:   DASH/TLC {Sodium & Cholesterol Restricted} (DASH)  Lacto Veg (Accepts Milk & Milk Products) (10-21-23)

## 2023-10-23 NOTE — DIETITIAN INITIAL EVALUATION ADULT - OTHER INFO
Dosing wt 114.9 pounds  115% IBW ( pounds)     Home Medications:  Multiple Vitamins oral tablet: 1 tab(s) orally once a day (22 Oct 2023 09:25)  simvastatin 5 mg oral tablet: 1 tab(s) orally once a day (21 Oct 2023 04:33)  Vitamins C, B, D, E:  (21 Oct 2023 04:33)

## 2023-10-23 NOTE — DIETITIAN INITIAL EVALUATION ADULT - ADD RECOMMEND
- RD to follow up with pt's family as able/ upon follow up; RD remains available to review diet education and adjust diet recommendations as needed.  - Will continue to monitor PO intake, weight, labs, skin, GI status, diet.   - Nutrition care plan to remain consistent with pt goals of care

## 2023-10-23 NOTE — PROGRESS NOTE ADULT - PROBLEM SELECTOR PLAN 3
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

## 2023-10-23 NOTE — PROGRESS NOTE ADULT - ASSESSMENT
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 Found to be COVID-19 positive, Dizziness has resolved. Remdesivir course to be completed on 10/23 AM. 
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 Found to be COVID-19 positive, Dizziness has resolved. Remdesivir course to be completed on 10/23 AM. 
[facility]

## 2023-10-23 NOTE — DISCHARGE NOTE NURSING/CASE MANAGEMENT/SOCIAL WORK - PATIENT PORTAL LINK FT
You can access the FollowMyHealth Patient Portal offered by [facility] by registering at the following website: [email or web address]. By joining [facility]’s FollowMyHealth portal, you will also be able to view your health information using other applications (apps) compatible with our system.

## 2023-10-23 NOTE — PROGRESS NOTE ADULT - PROBLEM SELECTOR PLAN 5
- DVT ppx; Lovenox  - Diet: DASH  - Dispo: after last dose of Remdesivir on 10/23 AM
- DVT ppx; Lovenox  - Diet: DASH  - Dispo: after last dose of Remdesivir on 10/23 AM

## 2023-10-23 NOTE — DIETITIAN INITIAL EVALUATION ADULT - PERTINENT MEDS FT
MEDICATIONS  (STANDING):  aspirin enteric coated 81 milliGRAM(s) Oral daily  chlorhexidine 2% Cloths 1 Application(s) Topical daily  enoxaparin Injectable 40 milliGRAM(s) SubCutaneous every 24 hours  multivitamin 1 Tablet(s) Oral daily  simvastatin 5 milliGRAM(s) Oral at bedtime    MEDICATIONS  (PRN):  acetaminophen     Tablet .. 650 milliGRAM(s) Oral every 6 hours PRN Temp greater or equal to 38C (100.4F), Mild Pain (1 - 3)  guaiFENesin Oral Liquid (Sugar-Free) 200 milliGRAM(s) Oral every 6 hours PRN Cough  meclizine 25 milliGRAM(s) Oral two times a day PRN Dizziness

## 2023-10-23 NOTE — DIETITIAN INITIAL EVALUATION ADULT - REASON INDICATOR FOR ASSESSMENT
Consult received for MST score 2 or >   Information obtained from RN, electronic medical record  Chart reviewed, events noted

## 2023-10-23 NOTE — PROGRESS NOTE ADULT - PROBLEM SELECTOR PLAN 1
Presented with sudden persistent vertigo x1 day a/w nausea/vomiting. No new focal neurological deficits elicited on exam. CTH noted small hypoattenuation in L frontal lobe ("may represent age-indeterminate embolic infarct"). CTA H/N were w/o acute findings. Also found to be COVID-19 positive. Suspect more likely 2/2 COVID-19 infection vs less likely CVA at this time.  - Appreciate Neurology eval/recs = Acute vestibular syndrome of unclear etiology likely peripheral etiology; intact vertebrobasilar system on cta to my eye. CTP negative. 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 Symptoms now improved; [] No neurological contraindication to discharge as patient is otherwise stable [] OK to continue aspirin 81mg PO Qd     - c/w ASA and statin for now  - c/w meclizine 25mg BID PRN for dizziness though pt denies any further symptoms.   - PT eval HPT.   - MRI as above
Presented with sudden persistent vertigo x1 day a/w nausea/vomiting. No new focal neurological deficits elicited on exam. CTH noted small hypoattenuation in L frontal lobe ("may represent age-indeterminate embolic infarct"). CTA H/N were w/o acute findings. Also found to be COVID-19 positive. Suspect more likely 2/2 COVID-19 infection vs less likely CVA at this time.  - Appreciate Neurology eval/recs = Acute vestibular syndrome of unclear etiology likely peripheral etiology; intact vertebrobasilar system on cta to my eye. CTP negative. 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 Symptoms now improved; [] No neurological contraindication to discharge as patient is otherwise stable [] OK to continue aspirin 81mg PO Qd     - c/w ASA and statin for now  - c/w zofran IV q6h PRN for n/v  - c/w meclizine 25mg BID PRN for dizziness  - can also try clonazepam 0.5mg q8h PRN (for 2-3 days) per Neuro  - maintain fall/aspiration precautions  - PT eval  - MRI as above

## 2023-10-23 NOTE — DISCHARGE NOTE NURSING/CASE MANAGEMENT/SOCIAL WORK - NSDCFUADDAPPT_GEN_ALL_CORE_FT
APPTS ARE READY TO BE MADE: [X] YES    Best Family or Patient Contact (if needed):    Additional Information about above appointments (if needed):    1: PCP  2:   3:     Other comments or requests:

## 2023-10-23 NOTE — PROGRESS NOTE ADULT - SUBJECTIVE AND OBJECTIVE BOX
[facility] Division of Hospital Medicine  Guillermina Gibson MD  Pager (M-F, 7P-4Z): [phone]  Other Times:  [phone]    Patient is a 83y old  Female who presents with a chief complaint of dizziness/vertigo, COVID-19 (22 Oct 2023 10:11)      SUBJECTIVE / OVERNIGHT EVENTS:   service used. feeling well. denies any complaints. wants to go home.  able to ambulate without dizziness.     ADDITIONAL REVIEW OF SYSTEMS: otherwise neg    MEDICATIONS  (STANDING):  aspirin enteric coated 81 milliGRAM(s) Oral daily  chlorhexidine 2% Cloths 1 Application(s) Topical daily  enoxaparin Injectable 40 milliGRAM(s) SubCutaneous every 24 hours  multivitamin 1 Tablet(s) Oral daily  simvastatin 5 milliGRAM(s) Oral at bedtime    MEDICATIONS  (PRN):  acetaminophen     Tablet .. 650 milliGRAM(s) Oral every 6 hours PRN Temp greater or equal to 38C (100.4F), Mild Pain (1 - 3)  guaiFENesin Oral Liquid (Sugar-Free) 200 milliGRAM(s) Oral every 6 hours PRN Cough  meclizine 25 milliGRAM(s) Oral two times a day PRN Dizziness      CAPILLARY BLOOD GLUCOSE        I&O's Summary    22 Oct 2023 07:01  -  23 Oct 2023 07:00  --------------------------------------------------------  IN: 300 mL / OUT: 0 mL / NET: 300 mL        PHYSICAL EXAM:  Vital Signs Last 24 Hrs  T(C): 36.7 (23 Oct 2023 10:57), Max: 36.7 (23 Oct 2023 04:34)  T(F): 98.1 (23 Oct 2023 10:57), Max: 98.1 (23 Oct 2023 10:57)  HR: 72 (23 Oct 2023 10:57) (66 - 72)  BP: 107/66 (23 Oct 2023 10:57) (107/66 - 118/66)  BP(mean): --  RR: 17 (23 Oct 2023 10:57) (17 - 18)  SpO2: 98% (23 Oct 2023 10:57) (95% - 98%)    Parameters below as of 23 Oct 2023 10:57  Patient On (Oxygen Delivery Method): room air        CONSTITUTIONAL: NAD, well-groomed  EYES:  conjunctiva and sclera clear  ENMT: Moist oral mucosa  NECK: Supple, no palpable masses; no JVD  RESPIRATORY: Normal respiratory effort; lungs are clear to auscultation bilaterally  CARDIOVASCULAR: Regular rate and rhythm, normal S1 and S2, no murmur/rub/gallop; No lower extremity edema  ABDOMEN: Nontender to palpation, normoactive bowel sounds, no rebound/guarding  MUSCULOSKELETAL:  no clubbing or cyanosis of digits; no joint swelling or tenderness to palpation  PSYCH: A+O to person, place, and time; affect appropriate  SKIN: No rashes; no palpable lesions    LABS:                        11.5   4.48  )-----------( 181      ( 22 Oct 2023 06:12 )             34.5     10-22    141  |  108  |  10  ----------------------------<  95  4.1   |  24  |  0.57    Ca    8.5      22 Oct 2023 06:12  Phos  2.5     10-22  Mg     2.1     10-22    TPro  5.9<L>  /  Alb  3.6  /  TBili  0.4  /  DBili  x   /  AST  16  /  ALT  11  /  AlkPhos  62  10-22          Urinalysis Basic - ( 22 Oct 2023 06:12 )    Color: x / Appearance: x / SG: x / pH: x  Gluc: 95 mg/dL / Ketone: x  / Bili: x / Urobili: x   Blood: x / Protein: x / Nitrite: x   Leuk Esterase: x / RBC: x / WBC x   Sq Epi: x / Non Sq Epi: x / Bacteria: x        Culture - Urine (collected 20 Oct 2023 21:29)  Source: Clean Catch Clean Catch (Midstream)  Final Report (23 Oct 2023 13:51):    50,000 - 99,000 CFU/mL Escherichia coli  Organism: Escherichia coli (23 Oct 2023 13:51)  Organism: Escherichia coli (23 Oct 2023 13:51)        RADIOLOGY & ADDITIONAL TESTS:  Results Reviewed:   Imaging Personally Reviewed:  Electrocardiogram Personally Reviewed:    COORDINATION OF CARE:  Care Discussed with Consultants/Other Providers [Y/N]:  Prior or Outpatient Records Reviewed [Y/N]:

## 2023-10-23 NOTE — PROGRESS NOTE ADULT - PROBLEM SELECTOR PLAN 2
Found to be COVID-19 positive. Noted she has had productive cough for ~1 wk and now vertigo.  - c/w 3-day course of Remdesivir for now - to be completed on 10/23 AM  - hold off on dexamethasone as pt is not hypoxic  - c/w symptomatic management  - airborne/contact isolation  - monitor respiratory status, currently sating well on RA
Found to be COVID-19 positive. Noted she has had productive cough for ~1 wk and now vertigo.  - c/w 3-day course of Remdesivir for now - to be completed on 10/23 AM  - hold off on dexamethasone as pt is not hypoxic  - c/w symptomatic management  - airborne/contact isolation  - monitor respiratory status, currently sating well on RA

## 2023-10-24 RX ORDER — SIMVASTATIN 20 MG/1
1 TABLET, FILM COATED ORAL
Refills: 0 | DISCHARGE

## 2023-11-13 PROCEDURE — 80053 COMPREHEN METABOLIC PANEL: CPT

## 2023-11-13 PROCEDURE — 82947 ASSAY GLUCOSE BLOOD QUANT: CPT

## 2023-11-13 PROCEDURE — 85610 PROTHROMBIN TIME: CPT

## 2023-11-13 PROCEDURE — 82803 BLOOD GASES ANY COMBINATION: CPT

## 2023-11-13 PROCEDURE — 70551 MRI BRAIN STEM W/O DYE: CPT

## 2023-11-13 PROCEDURE — 82962 GLUCOSE BLOOD TEST: CPT

## 2023-11-13 PROCEDURE — 80061 LIPID PANEL: CPT

## 2023-11-13 PROCEDURE — 84484 ASSAY OF TROPONIN QUANT: CPT

## 2023-11-13 PROCEDURE — 83036 HEMOGLOBIN GLYCOSYLATED A1C: CPT

## 2023-11-13 PROCEDURE — 84100 ASSAY OF PHOSPHORUS: CPT

## 2023-11-13 PROCEDURE — 99285 EMERGENCY DEPT VISIT HI MDM: CPT

## 2023-11-13 PROCEDURE — 70450 CT HEAD/BRAIN W/O DYE: CPT | Mod: MA

## 2023-11-13 PROCEDURE — 84145 PROCALCITONIN (PCT): CPT

## 2023-11-13 PROCEDURE — 85379 FIBRIN DEGRADATION QUANT: CPT

## 2023-11-13 PROCEDURE — 83605 ASSAY OF LACTIC ACID: CPT

## 2023-11-13 PROCEDURE — 97161 PT EVAL LOW COMPLEX 20 MIN: CPT

## 2023-11-13 PROCEDURE — 85027 COMPLETE CBC AUTOMATED: CPT

## 2023-11-13 PROCEDURE — 96374 THER/PROPH/DIAG INJ IV PUSH: CPT

## 2023-11-13 PROCEDURE — 84295 ASSAY OF SERUM SODIUM: CPT

## 2023-11-13 PROCEDURE — 81001 URINALYSIS AUTO W/SCOPE: CPT

## 2023-11-13 PROCEDURE — 0042T: CPT | Mod: MA

## 2023-11-13 PROCEDURE — 85025 COMPLETE CBC W/AUTO DIFF WBC: CPT

## 2023-11-13 PROCEDURE — 85014 HEMATOCRIT: CPT

## 2023-11-13 PROCEDURE — 70496 CT ANGIOGRAPHY HEAD: CPT | Mod: MA

## 2023-11-13 PROCEDURE — 85730 THROMBOPLASTIN TIME PARTIAL: CPT

## 2023-11-13 PROCEDURE — 70498 CT ANGIOGRAPHY NECK: CPT | Mod: MA

## 2023-11-13 PROCEDURE — 82435 ASSAY OF BLOOD CHLORIDE: CPT

## 2023-11-13 PROCEDURE — 87186 SC STD MICRODIL/AGAR DIL: CPT

## 2023-11-13 PROCEDURE — 87086 URINE CULTURE/COLONY COUNT: CPT

## 2023-11-13 PROCEDURE — 84132 ASSAY OF SERUM POTASSIUM: CPT

## 2023-11-13 PROCEDURE — 85018 HEMOGLOBIN: CPT

## 2023-11-13 PROCEDURE — 36415 COLL VENOUS BLD VENIPUNCTURE: CPT

## 2023-11-13 PROCEDURE — 71045 X-RAY EXAM CHEST 1 VIEW: CPT

## 2023-11-13 PROCEDURE — 82330 ASSAY OF CALCIUM: CPT

## 2023-11-13 PROCEDURE — 0225U NFCT DS DNA&RNA 21 SARSCOV2: CPT

## 2023-11-13 PROCEDURE — T1013: CPT

## 2023-11-13 PROCEDURE — 83735 ASSAY OF MAGNESIUM: CPT

## 2024-09-19 ENCOUNTER — APPOINTMENT (OUTPATIENT)
Dept: UROLOGY | Facility: CLINIC | Age: 84
End: 2024-09-19